# Patient Record
Sex: MALE | Race: WHITE | NOT HISPANIC OR LATINO | Employment: OTHER | URBAN - METROPOLITAN AREA
[De-identification: names, ages, dates, MRNs, and addresses within clinical notes are randomized per-mention and may not be internally consistent; named-entity substitution may affect disease eponyms.]

---

## 2021-07-29 ENCOUNTER — OFFICE VISIT (OUTPATIENT)
Dept: URGENT CARE | Facility: URGENT CARE | Age: 31
End: 2021-07-29

## 2021-07-29 VITALS
OXYGEN SATURATION: 98 % | HEART RATE: 80 BPM | TEMPERATURE: 98 F | DIASTOLIC BLOOD PRESSURE: 78 MMHG | SYSTOLIC BLOOD PRESSURE: 138 MMHG | RESPIRATION RATE: 20 BRPM

## 2021-07-29 DIAGNOSIS — R42 DIZZINESS: Primary | ICD-10-CM

## 2021-07-29 LAB
ALBUMIN SERPL-MCNC: 4.6 G/DL (ref 3.4–5)
ALP SERPL-CCNC: 65 U/L (ref 40–150)
ALT SERPL W P-5'-P-CCNC: 34 U/L
ANION GAP SERPL CALCULATED.3IONS-SCNC: 2 MMOL/L (ref 3–14)
AST SERPL W P-5'-P-CCNC: 27 U/L (ref 0–45)
BASOPHILS # BLD AUTO: 0 10E3/UL (ref 0–0.2)
BASOPHILS NFR BLD AUTO: 0 %
BILIRUB SERPL-MCNC: 1.1 MG/DL (ref 0.2–1.3)
BUN SERPL-MCNC: 10 MG/DL (ref 7–30)
CALCIUM SERPL-MCNC: 9.8 MG/DL (ref 8.5–10.1)
CHLORIDE BLD-SCNC: 105 MMOL/L (ref 94–109)
CO2 SERPL-SCNC: 30 MMOL/L (ref 20–32)
CREAT SERPL-MCNC: 1.2 MG/DL (ref 0.66–1.25)
EOSINOPHIL # BLD AUTO: 0.2 10E3/UL (ref 0–0.7)
EOSINOPHIL NFR BLD AUTO: 3 %
ERYTHROCYTE [DISTWIDTH] IN BLOOD BY AUTOMATED COUNT: 11.1 % (ref 10–15)
GFR SERPL CREATININE-BSD FRML MDRD: 80 ML/MIN/1.73M2
GLUCOSE BLD-MCNC: 91 MG/DL (ref 70–99)
HCT VFR BLD AUTO: 45.1 % (ref 40–53)
HGB BLD-MCNC: 15.2 G/DL (ref 13.3–17.7)
LYMPHOCYTES # BLD AUTO: 2.3 10E3/UL (ref 0.8–5.3)
LYMPHOCYTES NFR BLD AUTO: 32 %
MCH RBC QN AUTO: 32 PG (ref 26.5–33)
MCHC RBC AUTO-ENTMCNC: 33.7 G/DL (ref 31.5–36.5)
MCV RBC AUTO: 95 FL (ref 78–100)
MONOCYTES # BLD AUTO: 0.6 10E3/UL (ref 0–1.3)
MONOCYTES NFR BLD AUTO: 8 %
NEUTROPHILS # BLD AUTO: 4.1 10E3/UL (ref 1.6–8.3)
NEUTROPHILS NFR BLD AUTO: 57 %
PLATELET # BLD AUTO: 252 10E3/UL (ref 150–450)
POTASSIUM BLD-SCNC: 3.8 MMOL/L (ref 3.4–5.3)
PROT SERPL-MCNC: 7.8 G/DL (ref 6.8–8.8)
RBC # BLD AUTO: 4.75 10E6/UL (ref 4.4–5.9)
SODIUM SERPL-SCNC: 137 MMOL/L (ref 133–144)
WBC # BLD AUTO: 7.2 10E3/UL (ref 4–11)

## 2021-07-29 PROCEDURE — 36415 COLL VENOUS BLD VENIPUNCTURE: CPT | Performed by: FAMILY MEDICINE

## 2021-07-29 PROCEDURE — 99203 OFFICE O/P NEW LOW 30 MIN: CPT | Performed by: FAMILY MEDICINE

## 2021-07-29 PROCEDURE — 93000 ELECTROCARDIOGRAM COMPLETE: CPT | Performed by: FAMILY MEDICINE

## 2021-07-29 PROCEDURE — 80050 GENERAL HEALTH PANEL: CPT | Performed by: FAMILY MEDICINE

## 2021-07-29 NOTE — PATIENT INSTRUCTIONS
Patient Education     Possible Causes of Dizziness or Fainting    Dizziness and fainting can have many causes. Below are some examples of possible causes your healthcare provider will look to rule out.   Benign paroxysmal positional vertigo (BPPV)  BPPV results when calcium crystals inside the inner ear shift into the wrong position. BPPV causes episodes of vertigo, a spinning sensation. Episodes most often happen when the head is moved in a certain way. This is more common in people age 65 and older.    Infection or inflammation  The semicircular canals of the ear may become infected or inflamed. In this case, they can send the wrong balance signals. This can cause vertigo.   Meniere disease  Meniere disease happens when there is too much fluid in the semicircular canals. This can cause vertigo. It also can cause hearing problems and buzzing or ringing in the ears (called tinnitus). You may also have a feeling of pressure or fullness in the ear.   Syncope  Syncope is fainting that happens when the brain doesn t get enough oxygen-rich blood. It can be caused by low heart rate or low blood pressure. This is called vasovagal syncope. It can also be caused by sitting or standing up too quickly. This is called orthostatic hypotension. Syncope may also be due to a heart valve problem, an abnormal heart rhythm, or other heart problems. Dizziness can also happen from stroke, hemorrhage in the brain, or other problems in the brain. Your healthcare provider may do certain tests to rule out these conditions.   Other causes  Other causes include:    Medicines. Certain medicines can cause dizziness and even fainting. In some cases, stopping a medicine too quickly can lead to withdrawal symptoms, including dizziness and fainting.    Anxiety. Being anxious can lead to breathing changes, such as hyperventilation. These can lead to dizziness and fainting.  Additional causes for dizziness and fainting also exist. Talk with your  healthcare provider for more information.      Mario last reviewed this educational content on 5/1/2018 2000-2021 [employer]. All rights reserved. This information is not intended as a substitute for professional medical care. Always follow your healthcare professional's instructions.           Patient Education     Understanding Dizziness, Balance Problems, and Fainting     The eyes, inner ear, joints, and muscles send signals to the brain to achieve balance.     Balance is a group effort of the eyes, inner ear, joints, and muscles. They each send signals to the brain about body position and head movement. Then the brain uses this information to achieve balance. When the brain receives conflicting signals, or when there is a problem with blood flow, dizziness or fainting can happen.   Vertigo  Vertigo is the feeling of spinning. It may happen if the brain receives conflicting balance signals. Vertigo is often caused by a problem in the inner ear. Problems include changes in inner ear structures, infection, swelling, or excess fluid. Sometimes vertigo is due to a brain problem, such as migraine, stroke, or tumor.   Dysequilibrium  Dysequilibrium is the feeling of imbalance without a sense of spinning. It may happen if the signal path between the body and brain is disrupted. There are many causes of dysequilibrium, including diabetes, anemia, head injury, and aging.   Syncope  Syncope is losing consciousness or fainting. The brain needs oxygen-rich blood to function. The heart pumps that blood to the brain. If there is a problem with the heart, blood flow (such as low blood pressure), or blood vessels, you may faint.   Mario last reviewed this educational content on 5/1/2018 2000-2021 [employer]. All rights reserved. This information is not intended as a substitute for professional medical care. Always follow your healthcare professional's instructions.           Patient  "Education     Heart Palpitations    Palpitations are the feeling that your heart is beating hard, fast, or irregular. Some describe it as \"pounding,\" \"flip-flopping in the chest,\" or \"skipped beats.\" Palpitations may occur in someone with heart disease. But they can also occur in a healthy person.  Heart-related causes:    Heart rhythm problem (arrhythmia)    Heart valve disease    Disease of the heart muscle (cardiomyopathy)    Coronary artery disease    High blood pressure  Non-heart-related causes:    Certain medicines such as asthma inhalers and decongestants    Some herbal supplements, energy drinks and pills, and weight loss pills    Illegal stimulant drugs such as cocaine, crank, methamphetamine, PCP, bath salts, and ecstasy    Caffeine, alcohol, and tobacco    Health conditions such as thyroid disease, anemia, anxiety, and panic disorder  Sometimes the cause can't be found.  Home care  Follow these home care tips:    Don't use too much caffeine, alcohol, or tobacco, or any stimulant drugs.    Tell your doctor about any prescription or over-the-counter or herbal medicines you take.  Follow-up care    Follow up with your doctor, or as advised.    Call 911  This is the fastest and safest way to get to the emergency department. The paramedics can also start treatment on the way to the hospital, if needed.  Don't wait until your symptoms are severe to call 911. These are reasons to call 911:    Chest pain    Shortness of breath    Feeling lightheaded, faint, or dizzy, or losing consciousness    Very irregular heartbeat    Rapid heartbeat that makes you uncomfortable    Slower than usual heart rate along with symptoms    Chest pain with weakness, dizziness, heavy sweating, nausea, or vomiting    Extreme drowsiness, confusion, or weakness    Weakness of an arm or leg, or on one side of the face    Trouble with speech or vision  When to seek medical advice  Call your healthcare provider right away if you have " palpitations that last longer than normal, or are different from your past palpitations.  [employer] last reviewed this educational content on 11/1/2019 2000-2021 [employer]. All rights reserved. This information is not intended as a substitute for professional medical care. Always follow your healthcare professional's instructions.

## 2021-07-29 NOTE — PROGRESS NOTES
SUBJECTIVE:   Skylar Gilman is a 30 year old male presenting with a chief complaint of weak, dizziness, headaches.    4 weeks ago, started to feel weak and dizzy, will feel like he will pass out.  Brief episode, denies any LOC.  No known trigger.  Will occur couple of times a day but seems to occur by end of the day.  Denies vertigo/spinning.  Feels more fatigue, worried that may have low glucose.  Gets headaches when feels dizzy but again this subsides.  Denies any vision changes.  Denies any palpitations when symptoms starts.    Denies any recent URI, no fever.  Overall healthy, has been in [place] 5 years and has not seen a doctor.    Denies any ill contacts.  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      No past medical history on file.  No current outpatient medications on file.     Social History     Tobacco Use     Smoking status: Current Every Day Smoker     Types: Cigarettes     Smokeless tobacco: Never Used   Substance Use Topics     Alcohol use: Not Currently       ROS:  Review of systems negative except as stated above.    OBJECTIVE:  /78   Pulse 80   Temp 98  F (36.7  C) (Tympanic)   Resp 20   SpO2 98%   GENERAL APPEARANCE: healthy, alert and no distress  EYES: EOMI,  PERRL, conjunctiva clear  HENT: ear canals and TM's normal.  RESP: lungs clear to auscultation - no rales, rhonchi or wheezes  CV: regular rates and rhythm, normal S1 S2  Extremities: no peripheral edema or tenderness, peripheral pulses normal  PSYCH: mentation appears normal and affect normal/bright    Results for orders placed or performed in visit on 07/29/21   Comprehensive metabolic panel (BMP + Alb, Alk Phos, ALT, AST, Total. Bili, TP)     Status: Abnormal   Result Value Ref Range    Sodium 137 133 - 144 mmol/L    Potassium 3.8 3.4 - 5.3 mmol/L    Chloride 105 94 - 109 mmol/L    Carbon Dioxide (CO2) 30 20 - 32 mmol/L    Anion Gap 2 (L) 3 - 14 mmol/L    Urea Nitrogen 10 7 - 30 mg/dL     Creatinine 1.20 0.66 - 1.25 mg/dL    Calcium 9.8 8.5 - 10.1 mg/dL    Glucose 91 70 - 99 mg/dL    Alkaline Phosphatase 65 40 - 150 U/L    AST 27 0 - 45 U/L    ALT 34 U/L    Protein Total 7.8 6.8 - 8.8 g/dL    Albumin 4.6 3.4 - 5.0 g/dL    Bilirubin Total 1.1 0.2 - 1.3 mg/dL    GFR Estimate 80 >60 mL/min/1.73m2   CBC with platelets and differential     Status: None   Result Value Ref Range    WBC Count 7.2 4.0 - 11.0 10e3/uL    RBC Count 4.75 4.40 - 5.90 10e6/uL    Hemoglobin 15.2 13.3 - 17.7 g/dL    Hematocrit 45.1 40.0 - 53.0 %    MCV 95 78 - 100 fL    MCH 32.0 26.5 - 33.0 pg    MCHC 33.7 31.5 - 36.5 g/dL    RDW 11.1 10.0 - 15.0 %    Platelet Count 252 150 - 450 10e3/uL    % Neutrophils 57 %    % Lymphocytes 32 %    % Monocytes 8 %    % Eosinophils 3 %    % Basophils 0 %    Absolute Neutrophils 4.1 1.6 - 8.3 10e3/uL    Absolute Lymphocytes 2.3 0.8 - 5.3 10e3/uL    Absolute Monocytes 0.6 0.0 - 1.3 10e3/uL    Absolute Eosinophils 0.2 0.0 - 0.7 10e3/uL    Absolute Basophils 0.0 0.0 - 0.2 10e3/uL   CBC with platelets and differential     Status: None    Narrative    The following orders were created for panel order CBC with platelets and differential.  Procedure                               Abnormality         Status                     ---------                               -----------         ------                     CBC with platelets and d...[[id number]]                      Final result                 Please view results for these tests on the individual orders.       EKG - NSR, rate 73, no PVC, no ST c/w ischemia      ASSESSMENT/PLAN:  (R42) Dizziness  (primary encounter diagnosis)  Comment: unclear etiology  Plan: CBC with platelets and differential,         Comprehensive metabolic panel (BMP + Alb, Alk         Phos, ALT, AST, Total. Bili, TP), TSH with free        T4 reflex, EKG 12-lead complete w/read -         Clinics            Unclear etiology for brief intermittent dizziness and lightheadedness.   Patient appears well with normal vitals and no acute distress.  Reviewed importance of further evaluation if all pending labs are normal.  Encourage to drink plenty of fluids, especially due to recent hot weather and as he works outside.  Reviewed that cardiac etiology may require Holter or Zio patch to assess when he has episodes of dizziness but concerns regarding cost due to no medical health coverage.    Recommend to establish care with primary provider for further evaluation, should contact business office/ in regards to lack of medical coverage    Kael Truong MD,  July 29, 2021 8:46 PM

## 2021-07-30 LAB — TSH SERPL DL<=0.005 MIU/L-ACNC: 1.73 MU/L (ref 0.4–4)

## 2021-08-07 ENCOUNTER — HOSPITAL ENCOUNTER (EMERGENCY)
Facility: CLINIC | Age: 31
Discharge: HOME | End: 2021-08-08
Attending: EMERGENCY MEDICINE | Admitting: EMERGENCY MEDICINE

## 2021-08-07 ENCOUNTER — APPOINTMENT (OUTPATIENT)
Dept: GENERAL RADIOLOGY | Facility: CLINIC | Age: 31
End: 2021-08-07
Attending: EMERGENCY MEDICINE

## 2021-08-07 DIAGNOSIS — R55 SYNCOPE AND COLLAPSE: ICD-10-CM

## 2021-08-07 DIAGNOSIS — R07.89 ATYPICAL CHEST PAIN: ICD-10-CM

## 2021-08-07 LAB
ANION GAP SERPL CALCULATED.3IONS-SCNC: 7 MMOL/L (ref 3–14)
BASOPHILS # BLD AUTO: 0 10E3/UL (ref 0–0.2)
BASOPHILS NFR BLD AUTO: 1 %
BUN SERPL-MCNC: 15 MG/DL (ref 7–30)
CALCIUM SERPL-MCNC: 8.9 MG/DL (ref 8.5–10.1)
CHLORIDE BLD-SCNC: 108 MMOL/L (ref 94–109)
CO2 SERPL-SCNC: 26 MMOL/L (ref 20–32)
CREAT SERPL-MCNC: 1.21 MG/DL (ref 0.66–1.25)
EOSINOPHIL # BLD AUTO: 0.3 10E3/UL (ref 0–0.7)
EOSINOPHIL NFR BLD AUTO: 4 %
ERYTHROCYTE [DISTWIDTH] IN BLOOD BY AUTOMATED COUNT: 10.9 % (ref 10–15)
GFR SERPL CREATININE-BSD FRML MDRD: 79 ML/MIN/1.73M2
GLUCOSE BLD-MCNC: 143 MG/DL (ref 70–99)
HCT VFR BLD AUTO: 42.8 % (ref 40–53)
HGB BLD-MCNC: 14.5 G/DL (ref 13.3–17.7)
HOLD SPECIMEN: NORMAL
HOLD SPECIMEN: NORMAL
IMM GRANULOCYTES # BLD: 0 10E3/UL
IMM GRANULOCYTES NFR BLD: 0 %
LYMPHOCYTES # BLD AUTO: 2.6 10E3/UL (ref 0.8–5.3)
LYMPHOCYTES NFR BLD AUTO: 34 %
MCH RBC QN AUTO: 32.4 PG (ref 26.5–33)
MCHC RBC AUTO-ENTMCNC: 33.9 G/DL (ref 31.5–36.5)
MCV RBC AUTO: 96 FL (ref 78–100)
MONOCYTES # BLD AUTO: 0.6 10E3/UL (ref 0–1.3)
MONOCYTES NFR BLD AUTO: 8 %
NEUTROPHILS # BLD AUTO: 4.1 10E3/UL (ref 1.6–8.3)
NEUTROPHILS NFR BLD AUTO: 53 %
NRBC # BLD AUTO: 0 10E3/UL
NRBC BLD AUTO-RTO: 0 /100
PLATELET # BLD AUTO: 220 10E3/UL (ref 150–450)
POTASSIUM BLD-SCNC: 3.2 MMOL/L (ref 3.4–5.3)
RBC # BLD AUTO: 4.48 10E6/UL (ref 4.4–5.9)
SODIUM SERPL-SCNC: 141 MMOL/L (ref 133–144)
TROPONIN I SERPL-MCNC: <0.015 UG/L (ref 0–0.04)
WBC # BLD AUTO: 7.6 10E3/UL (ref 4–11)

## 2021-08-07 PROCEDURE — 71046 X-RAY EXAM CHEST 2 VIEWS: CPT

## 2021-08-07 PROCEDURE — 96361 HYDRATE IV INFUSION ADD-ON: CPT

## 2021-08-07 PROCEDURE — 93005 ELECTROCARDIOGRAM TRACING: CPT

## 2021-08-07 PROCEDURE — 258N000003 HC RX IP 258 OP 636: Performed by: EMERGENCY MEDICINE

## 2021-08-07 PROCEDURE — 250N000009 HC RX 250: Performed by: EMERGENCY MEDICINE

## 2021-08-07 PROCEDURE — 250N000013 HC RX MED GY IP 250 OP 250 PS 637: Performed by: EMERGENCY MEDICINE

## 2021-08-07 PROCEDURE — 85004 AUTOMATED DIFF WBC COUNT: CPT | Performed by: EMERGENCY MEDICINE

## 2021-08-07 PROCEDURE — 80048 BASIC METABOLIC PNL TOTAL CA: CPT | Performed by: EMERGENCY MEDICINE

## 2021-08-07 PROCEDURE — 99285 EMERGENCY DEPT VISIT HI MDM: CPT | Mod: 25

## 2021-08-07 PROCEDURE — 96374 THER/PROPH/DIAG INJ IV PUSH: CPT

## 2021-08-07 PROCEDURE — 36415 COLL VENOUS BLD VENIPUNCTURE: CPT | Performed by: EMERGENCY MEDICINE

## 2021-08-07 PROCEDURE — 84484 ASSAY OF TROPONIN QUANT: CPT | Performed by: EMERGENCY MEDICINE

## 2021-08-07 PROCEDURE — 93005 ELECTROCARDIOGRAM TRACING: CPT | Mod: 76

## 2021-08-07 RX ORDER — POTASSIUM CHLORIDE 1.5 G/1.58G
40 POWDER, FOR SOLUTION ORAL ONCE
Status: COMPLETED | OUTPATIENT
Start: 2021-08-08 | End: 2021-08-08

## 2021-08-07 RX ORDER — ASPIRIN 325 MG
325 TABLET ORAL ONCE
Status: COMPLETED | OUTPATIENT
Start: 2021-08-07 | End: 2021-08-07

## 2021-08-07 RX ADMIN — SODIUM CHLORIDE 1000 ML: 9 INJECTION, SOLUTION INTRAVENOUS at 23:37

## 2021-08-07 RX ADMIN — FAMOTIDINE 20 MG: 10 INJECTION INTRAVENOUS at 23:37

## 2021-08-07 RX ADMIN — ASPIRIN 325 MG ORAL TABLET 325 MG: 325 PILL ORAL at 23:37

## 2021-08-07 ASSESSMENT — ENCOUNTER SYMPTOMS
LIGHT-HEADEDNESS: 1
VOMITING: 0
SHORTNESS OF BREATH: 1
BLOOD IN STOOL: 0
CHEST TIGHTNESS: 1

## 2021-08-08 VITALS
OXYGEN SATURATION: 96 % | TEMPERATURE: 97.7 F | DIASTOLIC BLOOD PRESSURE: 69 MMHG | HEART RATE: 64 BPM | RESPIRATION RATE: 13 BRPM | SYSTOLIC BLOOD PRESSURE: 117 MMHG

## 2021-08-08 LAB
HOLD SPECIMEN: NORMAL
TROPONIN I SERPL-MCNC: <0.015 UG/L (ref 0–0.04)

## 2021-08-08 PROCEDURE — 84484 ASSAY OF TROPONIN QUANT: CPT | Performed by: EMERGENCY MEDICINE

## 2021-08-08 PROCEDURE — 36415 COLL VENOUS BLD VENIPUNCTURE: CPT | Performed by: EMERGENCY MEDICINE

## 2021-08-08 PROCEDURE — 250N000013 HC RX MED GY IP 250 OP 250 PS 637: Performed by: EMERGENCY MEDICINE

## 2021-08-08 RX ADMIN — POTASSIUM CHLORIDE 40 MEQ: 1.5 POWDER, FOR SOLUTION ORAL at 00:10

## 2021-08-08 NOTE — ED TRIAGE NOTES
Sudden onset Lt chest tightness when laying down to bed 30 min pta.  Pt diaphoretic and tachypneic in triage.

## 2021-08-08 NOTE — DISCHARGE INSTRUCTIONS
Discharge Instructions  Syncope    Syncope (fainting) is a sudden, short loss of consciousness (passing out spell). People will usually fall to the ground when they faint or slump over if seated.  People may also shake when this happens, and it can sometimes be difficult to tell the difference between syncope and a seizure. At this time, your provider does not find a reason to suspect that your fainting spell is a sign of anything dangerous or life-threatening.  However, sometimes the signs of serious illness do not show up right away.     Generally, every Emergency Department visit should have a follow-up clinic visit with either a primary or a specialty clinic/provider. Please follow-up as instructed by your emergency provider today.    Return to the Emergency Department if:  You faint again.   You have any significant bleeding.  You have chest pain or a fast or irregular heartbeat.  You feel short of breath.  You cough up any blood.  You have abdominal (belly) pain or unusual back pain.  You have ongoing vomiting (throwing up) or diarrhea (loose stools).  You have a black or tarry bowel movement, or blood in the stool or in your vomit.  You have a fever over 101 F.  You lose feeling or cannot move a part of your body or cannot talk normally.  You are confused, have a headache, cannot see well, or have a seizure.  DO NOT DRIVE. CALL 911 INSTEAD!    What can I do to help myself?  Follow any specific instructions that your provider discussed with you.  If you feel light-headed, make sure to sit down right away, even if you have to sit on the floor.  Follow up with your regular medical provider as discussed for further management. This may include lowering your blood pressure medications, insulin or other diabetic medications, checking your blood sugar more frequently, and drinking more fluids, taking medicines for vomiting or diarrhea or getting up slower.  If you were given a prescription for medicine here today,  be sure to read all of the information (including the package insert) that comes with your prescription.  This will include important information about the medicine, its side effects, and any warnings that you need to know about.  The pharmacist who fills the prescription can provide more information and answer questions you may have about the medicine.  If you have questions or concerns that the pharmacist cannot address, please call or return to the Emergency Department.   Remember that you can always come back to the Emergency Department if you are not able to see your regular provider in the amount of time listed above, if you get any new symptoms, or if there is anything that worries you.  Discharge Instructions  Chest Pain    You have been seen today for chest pain or discomfort.  At this time, your provider has found no signs that your chest pain is due to a serious or life-threatening condition, (or you have declined more testing and/or admission to the hospital). However, sometimes there is a serious problem that does not show up right away. Your evaluation today may not be complete and you may need further testing and evaluation.     Generally, every Emergency Department visit should have a follow-up clinic visit with either a primary or a specialty clinic/provider. Please follow-up as instructed by your emergency provider today.  Return to the Emergency Department if:  Your chest pain changes, gets worse, starts to happen more often, or comes with less activity.  You are newly short of breath.  You get very weak or tired.  You pass out or faint.  You have any new symptoms, like fever, cough, numb legs, or you cough up blood.  You have anything else that worries you.    Until you follow-up with your regular provider, please do the following:  Take one aspirin daily unless you have an allergy or are told not to by your provider.  If a stress test appointment has been made, go to the appointment.  If you  have questions, contact your regular provider.  Follow-up with your regular provider/clinic as directed; this is very important.    If you were given a prescription for medicine here today, be sure to read all of the information (including the package insert) that comes with your prescription.  This will include important information about the medicine, its side effects, and any warnings that you need to know about.  The pharmacist who fills the prescription can provide more information and answer questions you may have about the medicine.  If you have questions or concerns that the pharmacist cannot address, please call or return to the Emergency Department.       Remember that you can always come back to the Emergency Department if you are not able to see your regular provider in the amount of time listed above, if you get any new symptoms, or if there is anything that worries you.

## 2021-08-08 NOTE — ED PROVIDER NOTES
"  History   Chief Complaint:  Chest Pain    The history is provided by the patient and the spouse.      Skylar Gilman is a 30 year old male who presents with chest tightness. The patient reports three to four weeks of intermittent and random episodes of light-headedness. He also notes that a couple weeks ago, he woke up in the middle of the night from \"forgetting to breathe.\" He comes to the ED tonight because he experienced another episode of lightheadedness about an hour ago with associated shortness of breath (since resolved)  and chest tightness. Prior to the onset of his symptoms, Skylar recounts feeling gassy. The patient denies any black or bloody stool, or vomiting. Current smoker.     Review of Systems   Respiratory: Positive for chest tightness and shortness of breath.    Gastrointestinal: Negative for blood in stool and vomiting.   Neurological: Positive for light-headedness.   All other systems reviewed and are negative.        Allergies:  The patient has no known allergies.     Medications:  The patient is not currently taking any prescribed medications.    Past Medical History:    No past medical history on file.    Social History:  The patient presents to the ED with his significant other.     Physical Exam     Patient Vitals for the past 24 hrs:   BP Temp Temp src Pulse Resp SpO2   08/08/21 0100 117/69 -- -- 64 -- 96 %   08/08/21 0030 117/68 -- -- 66 -- 96 %   08/08/21 0000 119/64 -- -- 63 -- 96 %   08/07/21 2330 -- -- -- 73 -- 97 %   08/07/21 2300 120/71 -- -- 71 13 95 %   08/07/21 2213 (!) 157/87 97.7  F (36.5  C) Temporal 112 26 100 %       Physical Exam  Constitutional:  Oriented to person, place, and time.   HENT:   Head:    Normocephalic.   Mouth/Throat:   Oropharynx is clear and moist.   Eyes:    EOM are normal. Pupils are equal, round, and reactive to light.   Neck:    Neck supple.   Cardiovascular:  Normal rate, regular rhythm and normal heart sounds.      Exam reveals no gallop and " no friction rub.       No murmur heard.  Pulmonary/Chest:  Effort normal and breath sounds normal.      No respiratory distress. No wheezes. No rales.      No reproducible chest wall pain.  Abdominal:   Soft. No distension. No tenderness. No rebound and no guarding.   Musculoskeletal:  Normal range of motion.      2+ distal equal pulses.     No leg calf tenderness, swelling, or edema.  Neurological:   Alert and oriented to person, place, and time.           Moves all 4 extremities spontaneously    Skin:    No rash noted. No pallor.     Emergency Department Course   ECG  ECG taken at 2217, ECG read at 2217  Normal sinus rhythm. Normal ECG.   Rate 85 bpm. WY interval 148 ms. QRS duration 108 ms. QT/QTc 360/428 ms. P-R-T axes 77 75 60.     ECG #2  ECG taken at 0042, ECG read at 0043  Normal sinus rhythm. Normal ECG.    Rate 61 bpm. WY interval 146 ms. QRS duration 90 ms. QT/QTc 410/412 ms. P-R-T axes 20 75 63    Imaging:  XR chest PA & LAT  Negative chest.  As per radiology.    Laboratory:  CBC: WBC 7.6, HGB 14.5,      Troponin (Collected 2239): <0.015    BMP: potassium: 3.2(L), glucose: 143(H) o/w WNL (Creatinine 1.21)     Troponin (Collected 0038): <0.015    Emergency Department Course:    Reviewed:  I reviewed nursing notes, vitals, past medical history and care everywhere    Assessments:  2301 I obtained history and examined the patient as noted above.     Interventions:  2337 NS 1 L VI  2337 Aspirin 325 mg PO   2337 Pepcid 20 mg IV   0010 Potassium chloride 40 mEq PO     Disposition:  The patient was discharged to home.       Impression & Plan     Medical Decision Making:  This patient presents with a history and clinical exam consistent with syncope.  The differential for syncope is broad and includes etiologies such as cardiac arrythmia, ACS, aortic stenosis, HOCM, PE, orthostatic hypotension, drugs, situational, carotid hypersensitivity, seizure, TIA, stroke, vasovagal.   He has been having intermittent  mild chest pain at homes. EKG x2 as well as troponin are reassuring. He is low risk for heart score. I did set him up with an outpatient stress test. He is PERC negative and I would highly doubt pulmonary embolism. He is appropriate for outpatient workup.  There are no signs of a concerning etiology for syncope at this point.  In addition, there is no family history of sudden death, no chest pain, no seizure activity or post-ictal period, no murmur, and no signs of orthostasis in the ED, no focal neurologic symptoms, and no complaints of concerning headache.    The workup in the ED is negative and the physical exam is re-assurring.  Supportive outpatient management is therefore indicated.      Diagnosis:   1. Syncope    Plan:   1. Return to the ED with new or worse symptoms  2. Follow up with your PMD in 2 days for ongoing evaluation and management  3. Provided with standard [facility] Discharge instructions for Syncope      Covid-19  Skylar Gilman was evaluated during a global COVID-19 pandemic, which necessitated consideration that the patient might be at risk for infection with the SARS-CoV-2 virus that causes COVID-19.   Applicable protocols for evaluation were followed during the patient's care.     Diagnosis:    ICD-10-CM    1. Syncope and collapse  R55    2. Atypical chest pain  R07.89 Echo Stress Test with Definity       Discharge Medications:  None    Scribe Disclosure:  I, Favio Squires, am serving as a scribe at 11:01 PM on 8/7/2021 to document services personally performed by Danyel Wilson MD based on my observations and the provider's statements to me.            Danyel Wilson MD  08/08/21 [id number]

## 2021-08-09 LAB
ATRIAL RATE - MUSE: 61 BPM
ATRIAL RATE - MUSE: 85 BPM
DIASTOLIC BLOOD PRESSURE - MUSE: NORMAL MMHG
DIASTOLIC BLOOD PRESSURE - MUSE: NORMAL MMHG
INTERPRETATION ECG - MUSE: NORMAL
INTERPRETATION ECG - MUSE: NORMAL
P AXIS - MUSE: 20 DEGREES
P AXIS - MUSE: 77 DEGREES
PR INTERVAL - MUSE: 146 MS
PR INTERVAL - MUSE: 148 MS
QRS DURATION - MUSE: 108 MS
QRS DURATION - MUSE: 90 MS
QT - MUSE: 360 MS
QT - MUSE: 410 MS
QTC - MUSE: 412 MS
QTC - MUSE: 428 MS
R AXIS - MUSE: 75 DEGREES
R AXIS - MUSE: 75 DEGREES
SYSTOLIC BLOOD PRESSURE - MUSE: NORMAL MMHG
SYSTOLIC BLOOD PRESSURE - MUSE: NORMAL MMHG
T AXIS - MUSE: 60 DEGREES
T AXIS - MUSE: 63 DEGREES
VENTRICULAR RATE- MUSE: 61 BPM
VENTRICULAR RATE- MUSE: 85 BPM

## 2021-08-10 ENCOUNTER — HOSPITAL ENCOUNTER (OUTPATIENT)
Dept: CARDIOLOGY | Facility: CLINIC | Age: 31
Discharge: HOME | End: 2021-08-10
Attending: EMERGENCY MEDICINE | Admitting: EMERGENCY MEDICINE

## 2021-08-10 DIAGNOSIS — R07.89 ATYPICAL CHEST PAIN: ICD-10-CM

## 2021-08-10 PROCEDURE — 93350 STRESS TTE ONLY: CPT | Mod: 26 | Performed by: INTERNAL MEDICINE

## 2021-08-10 PROCEDURE — 93350 STRESS TTE ONLY: CPT | Mod: TC

## 2021-08-10 PROCEDURE — 93016 CV STRESS TEST SUPVJ ONLY: CPT | Performed by: INTERNAL MEDICINE

## 2021-08-10 PROCEDURE — 93325 DOPPLER ECHO COLOR FLOW MAPG: CPT | Mod: TC

## 2021-08-10 PROCEDURE — 93325 DOPPLER ECHO COLOR FLOW MAPG: CPT | Mod: 26 | Performed by: INTERNAL MEDICINE

## 2021-08-10 PROCEDURE — 93018 CV STRESS TEST I&R ONLY: CPT | Performed by: INTERNAL MEDICINE

## 2021-08-10 PROCEDURE — 93321 DOPPLER ECHO F-UP/LMTD STD: CPT | Mod: 26 | Performed by: INTERNAL MEDICINE

## 2021-08-22 ENCOUNTER — HEALTH MAINTENANCE LETTER (OUTPATIENT)
Age: 31
End: 2021-08-22

## 2021-10-17 ENCOUNTER — HEALTH MAINTENANCE LETTER (OUTPATIENT)
Age: 31
End: 2021-10-17

## 2022-02-04 ENCOUNTER — OFFICE VISIT (OUTPATIENT)
Dept: URGENT CARE | Facility: URGENT CARE | Age: 32
End: 2022-02-04

## 2022-02-04 ENCOUNTER — ANCILLARY PROCEDURE (OUTPATIENT)
Dept: GENERAL RADIOLOGY | Facility: CLINIC | Age: 32
End: 2022-02-04
Attending: PHYSICIAN ASSISTANT

## 2022-02-04 VITALS
TEMPERATURE: 98.4 F | HEART RATE: 72 BPM | OXYGEN SATURATION: 99 % | SYSTOLIC BLOOD PRESSURE: 124 MMHG | DIASTOLIC BLOOD PRESSURE: 70 MMHG

## 2022-02-04 DIAGNOSIS — R06.00 DYSPNEA, UNSPECIFIED TYPE: ICD-10-CM

## 2022-02-04 DIAGNOSIS — J38.3 VOCAL CORD DYSFUNCTION: ICD-10-CM

## 2022-02-04 DIAGNOSIS — R06.00 DYSPNEA, UNSPECIFIED TYPE: Primary | ICD-10-CM

## 2022-02-04 PROCEDURE — 99214 OFFICE O/P EST MOD 30 MIN: CPT | Performed by: PHYSICIAN ASSISTANT

## 2022-02-04 PROCEDURE — 71046 X-RAY EXAM CHEST 2 VIEWS: CPT | Performed by: RADIOLOGY

## 2022-02-05 NOTE — PATIENT INSTRUCTIONS
I suspect that you have vocal cord dysfunction.  I advise that you follow-up with ENT referral  If you have severe shortness of breath or difficulty breathing, please seek emergent help in ER.   Patient Education     Vocal Cord Dysfunction (VCD)  You have been told that you may have vocal cord dysfunction (VCD).   What is VCD?  The vocal cords are 2 bands of muscle and connective tissue inside the larynx (voice box). The larynx rests at the top of your trachea (windpipe). This is in your throat. Normally, when a person breathes in and out, air flows through the vocal cords and in and out of the lungs, allowing the person to breathe easily. But with VCD, the vocal cords close when they should open. When you breathe in, instead of opening, the vocal cords close and cut off the air supply.   Sometimes another part of your voice box above or around the vocal cords, is causing the blockage of your breathing. The problem is then called inspiratory laryngeal obstruction (ILO). VCD and ILO often have the same triggers and symptoms. They are also usually diagnosed and treated the same way.     What causes VCD?  The cause of VCD is often unclear. But these conditions or triggers can lead to it:     Post-nasal drip    Acid reflux, or gastroesophageal reflux disease (GERD)    Upper respiratory infections from a virus or bacteria    Exercise    Strong emotions, such as stress    Strong odors (fumes)    Cigarette smoke  Symptoms of VCD  Symptoms of VCD are like those for asthma. Sometimes people with VCD are thought to have asthma. But VCD symptoms are not eased by asthma medicines used to open the airway in your lungs, like bronchodilators. Also, symptoms of VCD often don't occur while you're sleeping.   Possible symptoms of VCD are:    Trouble breathing or shortness of breath. With VCD, you have more trouble breathing in than breathing out.    Noisy breathing (gasping, wheezing, or making raspy sounds)    Hoarseness    Voice  changes    Repeated coughing or throat clearing    Tightness in the chest or throat  Diagnosing VCD  You may be asked to do breathing tests to measure how well air flows into and out of your lungs. Laryngoscopy may also be done. This test allows the healthcare provider to view your vocal cords using a thin, often flexible scope called a laryngoscope. Because symptoms of VCD are like those of asthma, tests for asthma may be done. Some people may have both asthma and VCD.   Treating VCD  The main treatment for VCD is learning to manage and control symptoms when they occur. Your healthcare provider may advise:     Speech therapy. This teaches you how to control your vocal cords. A speech therapist or other specialist instructs you how to relax the muscles in your throat.    Relaxation techniques. Deep breathing, meditation, and activities like yoga can help reduce stress.    Biofeedback. This teaches you how to control certain physical functions and responses. You learn how to reduce muscle tension.    Psychotherapy. This treatment involves working with a specially trained mental health professional about your problems. He or she can help you better cope with stress.  Other methods of treatment may be available. Your healthcare provider can tell you more, if needed.   Preventing VCD  To help prevent episodes of VCD, make changes in your life to reduce and manage triggers. If the cause of your VCD is a health problem such as acid reflux, you may need to take medicine and change certain eating habits. If you smoke, quitting can help you control VCD. Stay away from secondhand smoke, too. Your healthcare provider can tell you more.   When to call 911  Get medical care right away if you have severe trouble breathing.  [employer] last reviewed this educational content on 2/1/2020 2000-2021 [employer]. All rights reserved. This information is not intended as a substitute for professional medical care. Always  follow your healthcare professional's instructions.           Patient Education     Vocal Cord Dysfunction (VCD)  You have been told that you may have vocal cord dysfunction (VCD).   What is VCD?  The vocal cords are 2 bands of muscle and connective tissue inside the larynx (voice box). The larynx rests at the top of your trachea (windpipe). This is in your throat. Normally, when a person breathes in and out, air flows through the vocal cords and in and out of the lungs, allowing the person to breathe easily. But with VCD, the vocal cords close when they should open. When you breathe in, instead of opening, the vocal cords close and cut off the air supply.   Sometimes another part of your voice box above or around the vocal cords, is causing the blockage of your breathing. The problem is then called inspiratory laryngeal obstruction (ILO). VCD and ILO often have the same triggers and symptoms. They are also usually diagnosed and treated the same way.     What causes VCD?  The cause of VCD is often unclear. But these conditions or triggers can lead to it:     Post-nasal drip    Acid reflux, or gastroesophageal reflux disease (GERD)    Upper respiratory infections from a virus or bacteria    Exercise    Strong emotions, such as stress    Strong odors (fumes)    Cigarette smoke  Symptoms of VCD  Symptoms of VCD are like those for asthma. Sometimes people with VCD are thought to have asthma. But VCD symptoms are not eased by asthma medicines used to open the airway in your lungs, like bronchodilators. Also, symptoms of VCD often don't occur while you're sleeping.   Possible symptoms of VCD are:    Trouble breathing or shortness of breath. With VCD, you have more trouble breathing in than breathing out.    Noisy breathing (gasping, wheezing, or making raspy sounds)    Hoarseness    Voice changes    Repeated coughing or throat clearing    Tightness in the chest or throat  Diagnosing VCD  You may be asked to do breathing  tests to measure how well air flows into and out of your lungs. Laryngoscopy may also be done. This test allows the healthcare provider to view your vocal cords using a thin, often flexible scope called a laryngoscope. Because symptoms of VCD are like those of asthma, tests for asthma may be done. Some people may have both asthma and VCD.   Treating VCD  The main treatment for VCD is learning to manage and control symptoms when they occur. Your healthcare provider may advise:     Speech therapy. This teaches you how to control your vocal cords. A speech therapist or other specialist instructs you how to relax the muscles in your throat.    Relaxation techniques. Deep breathing, meditation, and activities like yoga can help reduce stress.    Biofeedback. This teaches you how to control certain physical functions and responses. You learn how to reduce muscle tension.    Psychotherapy. This treatment involves working with a specially trained mental health professional about your problems. He or she can help you better cope with stress.  Other methods of treatment may be available. Your healthcare provider can tell you more, if needed.   Preventing VCD  To help prevent episodes of VCD, make changes in your life to reduce and manage triggers. If the cause of your VCD is a health problem such as acid reflux, you may need to take medicine and change certain eating habits. If you smoke, quitting can help you control VCD. Stay away from secondhand smoke, too. Your healthcare provider can tell you more.   When to call 911  Get medical care right away if you have severe trouble breathing.  [employer] last reviewed this educational content on 2/1/2020 2000-2021 [employer]. All rights reserved. This information is not intended as a substitute for professional medical care. Always follow your healthcare professional's instructions.

## 2022-02-05 NOTE — PROGRESS NOTES
"Assessment & Plan     1. Dyspnea, unspecified type  - XR Chest 2 Views; Future    2. Vocal cord dysfunction  - Otolaryngology Referral; Future      Patient comes in for evaluation of dyspnea with speaking ongoing for the past 2 weeks.  On exam, he looks well.  Lung and ENT exam is normal at this time.  Chest x-ray is normal per my read.  Some hoarseness is appreciated in his voice.  I suspect that his symptoms are related to vocal cord dysfunction.  I do have concerns as he has recently switched from smoking cigarettes to vaping, advised that he stop vaping now.  Referral to ENT was given for vocal cord dysfunction.  Discussed signs and symptoms for emergent follow-up.    Return in about 1 week (around 2/11/2022) for ENT.      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  Patient reports that for the past 2 weeks while talking he becomes easily winded.  He endorses having \"noisy \"breathing.  Had a terrible URI in February 2020, feels like it may have started at that time.  He denies having chest pain, pleurisy or hemoptysis.  He has not had any URI symptoms recently.  Approximately 1 month ago went from smoking cigarettes to vaping.  Had an echo last August which was normal.  No fever or chills.    No past medical history on file.  No past surgical history on file.  Social History     Tobacco Use     Smoking status: Current Every Day Smoker     Types: Cigarettes     Smokeless tobacco: Never Used   Substance Use Topics     Alcohol use: Not Currently     No current outpatient medications on file.     No current facility-administered medications for this visit.     No Known Allergies    10 " point ROS of systems were all negative except for pertinent positives noted in my HPI.      Exam:   /70   Pulse 72   Temp 98.4  F (36.9  C) (Tympanic)   SpO2 99%   Constitutional: healthy, alert and no distress  Head: Normocephalic, atraumatic.  Eyes: conjunctiva clear, no drainage  ENT: TMs clear and shiny fracisco, nasal mucosa pink and moist, throat without tonsillar hypertrophy or erythema  Neck: neck is supple, no cervical lymphadenopathy or nuchal rigidity  Cardiovascular: RRR  Respiratory: CTA bilaterally, no rhonchi or rales  Skin: no rashes  Neurologic: Speech clear, gait normal. Moves all extremities.    CXR -- No abnormality per my read

## 2022-10-03 ENCOUNTER — HEALTH MAINTENANCE LETTER (OUTPATIENT)
Age: 32
End: 2022-10-03

## 2022-10-06 ENCOUNTER — APPOINTMENT (OUTPATIENT)
Dept: ULTRASOUND IMAGING | Facility: CLINIC | Age: 32
End: 2022-10-06
Attending: EMERGENCY MEDICINE

## 2022-10-06 ENCOUNTER — HOSPITAL ENCOUNTER (EMERGENCY)
Facility: CLINIC | Age: 32
Discharge: HOME | End: 2022-10-06
Attending: EMERGENCY MEDICINE | Admitting: EMERGENCY MEDICINE

## 2022-10-06 VITALS
DIASTOLIC BLOOD PRESSURE: 82 MMHG | OXYGEN SATURATION: 97 % | HEART RATE: 77 BPM | SYSTOLIC BLOOD PRESSURE: 148 MMHG | TEMPERATURE: 98.1 F | RESPIRATION RATE: 19 BRPM

## 2022-10-06 DIAGNOSIS — R82.81 PYURIA: ICD-10-CM

## 2022-10-06 LAB
ALBUMIN UR-MCNC: NEGATIVE MG/DL
APPEARANCE UR: CLEAR
BILIRUB UR QL STRIP: NEGATIVE
COLOR UR AUTO: YELLOW
GLUCOSE UR STRIP-MCNC: NEGATIVE MG/DL
HGB UR QL STRIP: NEGATIVE
KETONES UR STRIP-MCNC: NEGATIVE MG/DL
LEUKOCYTE ESTERASE UR QL STRIP: NEGATIVE
MUCOUS THREADS #/AREA URNS LPF: PRESENT /LPF
NITRATE UR QL: NEGATIVE
PH UR STRIP: 5.5 [PH] (ref 5–7)
RBC URINE: 1 /HPF
SP GR UR STRIP: 1.03 (ref 1–1.03)
T VAGINALIS DNA SPEC QL NAA+PROBE: NOT DETECTED
UROBILINOGEN UR STRIP-MCNC: NORMAL MG/DL
WBC URINE: 1 /HPF

## 2022-10-06 PROCEDURE — 99284 EMERGENCY DEPT VISIT MOD MDM: CPT | Mod: 25

## 2022-10-06 PROCEDURE — 76870 US EXAM SCROTUM: CPT

## 2022-10-06 PROCEDURE — 87661 TRICHOMONAS VAGINALIS AMPLIF: CPT | Performed by: EMERGENCY MEDICINE

## 2022-10-06 PROCEDURE — 87491 CHLMYD TRACH DNA AMP PROBE: CPT | Performed by: EMERGENCY MEDICINE

## 2022-10-06 PROCEDURE — 81001 URINALYSIS AUTO W/SCOPE: CPT | Performed by: EMERGENCY MEDICINE

## 2022-10-06 PROCEDURE — 87591 N.GONORRHOEAE DNA AMP PROB: CPT | Performed by: EMERGENCY MEDICINE

## 2022-10-06 ASSESSMENT — ENCOUNTER SYMPTOMS
DYSURIA: 0
SHORTNESS OF BREATH: 0
VOMITING: 0
VOICE CHANGE: 0
FEVER: 0
NAUSEA: 0
NUMBNESS: 0

## 2022-10-06 NOTE — ED TRIAGE NOTES
Penis burning and right testicular aching x 4 days, denies any blood in urine or discharge, denies painful urination. VSS on RA.

## 2022-10-06 NOTE — ED NOTES
Rapid Assessment Note    History:   Skylar Gilman is a 31 year old male who presents with penile pain. The patient has had 4 days of constant pain in his penile region. After the 1st day, the pain became more of a burning sensation. He denies dysuria, urinary frequency, or penile discharge. He denies swelling or rash. He is sexually active.    Exam:   General:  Alert, interactive  Cardiovascular:  Well perfused  Lungs:  No respiratory distress, no accessory muscle use  Neuro:  Moving all 4 extremities  Skin:  Warm, dry  Psych:  Normal affect  :  Normal descended testicles   Cremasteric reflex intact bilaterally   No testicular or epididymal tenderness or mass appreciated   No inguinal hernia appreciated   No penile discharge      Plan of Care:   I evaluated the patient and developed an initial plan of care. I discussed this plan and explained that I, or one of my partners, would be returning to complete the evaluation.     I, Jameson Hired, am serving as a scribe to document services personally performed by Ermias Tompkins MD , based on my observations and the provider's statements to me.    10/6/2022  [employer] [employer]    Portions of this medical record were completed by a scribe. UPON MY REVIEW AND AUTHENTICATION BY ELECTRONIC SIGNATURE, this confirms (a) I performed the applicable clinical services, and (b) the record is accurate.        Ermias Tompkins MD  10/06/22 [id number]

## 2022-10-06 NOTE — ED PROVIDER NOTES
"  History     Chief Complaint:  Penis/Scrotum Problem     HPI:  The history is provided by the patient.      Skylar Gilman is an otherwise healthy 31 year old male who presents with 4 days of constant pain to his right testicle. On the second day of having this pain, he began having more of a \"burning\" sensation throughout his entire genitalia which has also remained constant. He denies dysuria and states that the pain/burning sensation is present during urination but does not worsen. He has had no associated symptoms such as penile discharge, fevers, nausea, vomiting, chest pain, shortness of breath, numbness, vision changes, hearing changes. He reports that he has had symptoms of this nature in the past; however, the pain has not persisted longer than 1-2 days. He has not sought medical evaluation for prior episodes. He suggests that these episodes could be related to the cold weather. He is  and sexually active only with his wife.  No anal intercourse or instrumentation.  No concern for STI. He states that he is otherwise healthy. He does not have a PCP.    Review of Systems   Constitutional: Negative for fever.   HENT: Negative for hearing loss and voice change.    Eyes: Negative for visual disturbance.   Respiratory: Negative for shortness of breath.    Cardiovascular: Negative for chest pain.   Gastrointestinal: Negative for nausea and vomiting.   Genitourinary: Positive for testicular pain. Negative for dysuria and penile discharge.   Neurological: Negative for numbness.   All other systems reviewed and are negative.    Allergies:  The patient has no known allergies.     Medications:  The patient is currently on no regular medications.     Past Medical History:     The patient denies past medical history.    Social History:  The patient presents to the ED alone.  The patient presents to the ED via car.   The patient is  and sexually active with his wife.    Physical Exam     Patient " Vitals for the past 24 hrs:   BP Temp Temp src Pulse Resp SpO2   10/06/22 1034 (!) 148/82 98.1  F (36.7  C) Temporal 77 19 97 %     Physical Exam  General: Sitting on the ED bed, no distress  HEENT: Normocephalic, atraumatic  Cardiac: Warm and well perfused  Pulm: Breathing comfortably, no accessory muscle usage, no conversational dyspnea  GI: Abdomen soft, nontender, no rigidity or guarding  : No penile discharge, no penile or scrotal redness or swelling, no testicular or epididymal tenderness to palpation on the right.  No palpable indirect inguinal hernia with cough.  MSK: No deformities  Skin: Warm and dry  Neuro: Moves all extremities x4, no focal deficits  Psych: Normal mood and affect     Emergency Department Course     Imaging:    US Testicular & Scrotum w Doppler Ltd  Negative for torsion or other acute process.  Report per radiology    Laboratory:  Labs Ordered and Resulted from Time of ED Arrival to Time of ED Departure   ROUTINE UA WITH MICROSCOPIC REFLEX TO CULTURE - Abnormal       Result Value    Color Urine Yellow      Appearance Urine Clear      Glucose Urine Negative      Bilirubin Urine Negative      Ketones Urine Negative      Specific Gravity Urine 1.026      Blood Urine Negative      pH Urine 5.5      Protein Albumin Urine Negative      Urobilinogen Urine Normal      Nitrite Urine Negative      Leukocyte Esterase Urine Negative      Mucus Urine Present (*)     RBC Urine 1      WBC Urine 1     TRICHOMONAS VAGINALIS BY PCR   CHLAMYDIA TRACHOMATIS PCR   NEISSERIA GONORRHOEAE PCR     Emergency Department Course:       Reviewed:  I reviewed nursing notes, vitals, past medical history and Care Everywhere    Assessments:  1232 I obtained history and examined the patient as noted above.     Disposition:  The patient was discharged to home.     Impression & Plan     Medical Decision Making:  Skylar Gilman is a 31 year old male who presents to the emergency department for evaluation of burning  genitalia.  He is otherwise well-appearing and does not appear to be systemically ill.  His ultrasound is negative for acute findings and his UA is without evidence of UTI.  His exam as above is largely nonfocal.  He is in a monogamous relationship with his spouse, however STI still remains a possibility.  Urine studies for gonorrhea, chlamydia, trichomonas were ordered accordingly.  Plan is for discharge home with follow-up as an outpatient as indicated.    Diagnosis:    ICD-10-CM    1. Pyuria  R82.81      Scribe Disclosure:  I, Suzanna Lin, am serving as a scribe at 12:22 PM on 10/6/2022 to document services personally performed by Dank Valle MD based on my observations and the provider's statements to me.     MD Leonard LYON Colin, MD  10/06/22 [id number]

## 2022-10-07 LAB
C TRACH DNA SPEC QL NAA+PROBE: NEGATIVE
N GONORRHOEA DNA SPEC QL NAA+PROBE: NEGATIVE

## 2022-10-07 NOTE — RESULT ENCOUNTER NOTE
Final result for both N. Gonorrhoeae PCR and Chlamydia Trachomatis PCR are NEGATIVE.  No treatment or change in treatment per [facility] ED Lab Result N. Gonorrhea AND/OR Chlamydia T. protocol.

## 2023-03-23 ENCOUNTER — ANCILLARY PROCEDURE (OUTPATIENT)
Dept: GENERAL RADIOLOGY | Facility: CLINIC | Age: 33
End: 2023-03-23
Attending: FAMILY MEDICINE
Payer: COMMERCIAL

## 2023-03-23 ENCOUNTER — OFFICE VISIT (OUTPATIENT)
Dept: URGENT CARE | Facility: URGENT CARE | Age: 33
End: 2023-03-23
Payer: COMMERCIAL

## 2023-03-23 VITALS
RESPIRATION RATE: 16 BRPM | SYSTOLIC BLOOD PRESSURE: 138 MMHG | WEIGHT: 213.25 LBS | HEART RATE: 71 BPM | DIASTOLIC BLOOD PRESSURE: 72 MMHG | OXYGEN SATURATION: 97 % | TEMPERATURE: 98.2 F

## 2023-03-23 DIAGNOSIS — R42 DIZZINESS: Primary | ICD-10-CM

## 2023-03-23 DIAGNOSIS — M54.2 NECK PAIN: ICD-10-CM

## 2023-03-23 PROCEDURE — 72040 X-RAY EXAM NECK SPINE 2-3 VW: CPT | Mod: TC | Performed by: RADIOLOGY

## 2023-03-23 PROCEDURE — 99214 OFFICE O/P EST MOD 30 MIN: CPT | Performed by: FAMILY MEDICINE

## 2023-03-23 RX ORDER — METHOCARBAMOL 500 MG/1
500 TABLET, FILM COATED ORAL 4 TIMES DAILY PRN
Qty: 30 TABLET | Refills: 0 | Status: SHIPPED | OUTPATIENT
Start: 2023-03-23 | End: 2023-03-28

## 2023-03-23 NOTE — PATIENT INSTRUCTIONS
Okay to take ibuprofen 200 mg - 4 tablets (800 mg) every 8 hours as needed.  Okay to take tylenol 500 mg - 2 tablets (1000 mg) every 6-8 hours as needed, do not exceed 3000 mg in 24 hours.  Okay to take muscle relaxant to help with neck muscle tightness/spasm    Follow up with spine clinic    Follow up with primary provider for further work up for dizziness

## 2023-03-23 NOTE — PROGRESS NOTES
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    Will get dizziness episode once a month, will last for weeks at time.  Did have cardiac work up and blood test and reassuring.    Feels lightheaded will last for weeks and then will resolve on its own.    Vertigo for couple of minutes up to 15 minutes, worse with certain movement.  This will resolve on its own.  No ringing or hear loss associated.    Denies any specific trauma.  Does work in construction work for 6 years, developed more discomfort over the past 3 years.  Feels like hands are weaker.  Denies any numbness or tingling, denies any headache.    No past medical history on file.  No current outpatient medications on file.     Social History     Tobacco Use     Smoking status: Every Day     Types: Cigarettes     Smokeless tobacco: Never   Substance Use Topics     Alcohol use: Not Currently       ROS:  Review of systems negative except as stated above.    EXAM:   /72   Pulse 71   Temp 98.2  F (36.8  C)   Resp 16   Wt 96.7 kg (213 lb 4 oz)   SpO2 97%   GENERAL APPEARANCE: healthy, alert and no distress  NECK: supple, mild tender to palpation on trapezius muscles, FROM   EXTREMITIES: peripheral pulses normal  PSYCH:alert, affect bright    X-RAY was done - cervical - loss of normal cervical curvature, no acute fracture personally viewed by me    ASSESSMENT/PLAN:  (R42) Dizziness  (primary encounter diagnosis)  Comment: chronic  Plan: follow up with primary provider      (M54.2) Neck pain  Plan: XR Cervical Spine 2/3 Views, methocarbamol         (ROBAXIN) 500 MG tablet, Spine          Referral            Discussed patient's concern for  dizziness/lightheadedness which has been ongoing concern for the past 2 years, prior evaluation for cardiac etiology has been negative and discussed that further work up is thru primary provider.    Reviewed neck pain and probable muscle spasm, patient complains of bilateral arm weakness so will refer to Spine Clinic for further evaluation.  RX robaxin given for neck muscle spasm, okay for tylenol and ibuprofen.  Will follow up on formal Xray report and notify if any abnormalities.    Follow up with primary provider in 1-2 weeks  Follow up with spine clinic in 1-2 weeks    Kael Truong MD  March 23, 2023 12:30 PM

## 2023-03-28 ENCOUNTER — OFFICE VISIT (OUTPATIENT)
Dept: FAMILY MEDICINE | Facility: CLINIC | Age: 33
End: 2023-03-28
Payer: COMMERCIAL

## 2023-03-28 VITALS
RESPIRATION RATE: 16 BRPM | HEIGHT: 70 IN | WEIGHT: 215 LBS | HEART RATE: 92 BPM | SYSTOLIC BLOOD PRESSURE: 120 MMHG | DIASTOLIC BLOOD PRESSURE: 82 MMHG | BODY MASS INDEX: 30.78 KG/M2 | OXYGEN SATURATION: 97 % | TEMPERATURE: 97.7 F

## 2023-03-28 DIAGNOSIS — M54.2 NECK PAIN: ICD-10-CM

## 2023-03-28 DIAGNOSIS — R73.9 HYPERGLYCEMIA: ICD-10-CM

## 2023-03-28 DIAGNOSIS — R42 VERTIGO: Primary | ICD-10-CM

## 2023-03-28 DIAGNOSIS — Z11.4 SCREENING FOR HIV (HUMAN IMMUNODEFICIENCY VIRUS): ICD-10-CM

## 2023-03-28 DIAGNOSIS — Z11.59 NEED FOR HEPATITIS C SCREENING TEST: ICD-10-CM

## 2023-03-28 LAB
ANION GAP SERPL CALCULATED.3IONS-SCNC: 13 MMOL/L (ref 7–15)
BUN SERPL-MCNC: 15.9 MG/DL (ref 6–20)
CALCIUM SERPL-MCNC: 9.8 MG/DL (ref 8.6–10)
CHLORIDE SERPL-SCNC: 102 MMOL/L (ref 98–107)
CREAT SERPL-MCNC: 1.11 MG/DL (ref 0.67–1.17)
DEPRECATED HCO3 PLAS-SCNC: 26 MMOL/L (ref 22–29)
ERYTHROCYTE [DISTWIDTH] IN BLOOD BY AUTOMATED COUNT: 11.6 % (ref 10–15)
GFR SERPL CREATININE-BSD FRML MDRD: 89 ML/MIN/1.73M2
GLUCOSE SERPL-MCNC: 93 MG/DL (ref 70–99)
HBA1C MFR BLD: 4.7 % (ref 0–5.6)
HCT VFR BLD AUTO: 45.1 % (ref 40–53)
HGB BLD-MCNC: 15.8 G/DL (ref 13.3–17.7)
MCH RBC QN AUTO: 32.4 PG (ref 26.5–33)
MCHC RBC AUTO-ENTMCNC: 35 G/DL (ref 31.5–36.5)
MCV RBC AUTO: 92 FL (ref 78–100)
PLATELET # BLD AUTO: 247 10E3/UL (ref 150–450)
POTASSIUM SERPL-SCNC: 4.2 MMOL/L (ref 3.4–5.3)
RBC # BLD AUTO: 4.88 10E6/UL (ref 4.4–5.9)
SODIUM SERPL-SCNC: 141 MMOL/L (ref 136–145)
WBC # BLD AUTO: 7.4 10E3/UL (ref 4–11)

## 2023-03-28 PROCEDURE — 87389 HIV-1 AG W/HIV-1&-2 AB AG IA: CPT | Performed by: FAMILY MEDICINE

## 2023-03-28 PROCEDURE — 83036 HEMOGLOBIN GLYCOSYLATED A1C: CPT | Performed by: FAMILY MEDICINE

## 2023-03-28 PROCEDURE — 85027 COMPLETE CBC AUTOMATED: CPT | Performed by: FAMILY MEDICINE

## 2023-03-28 PROCEDURE — 99215 OFFICE O/P EST HI 40 MIN: CPT | Performed by: FAMILY MEDICINE

## 2023-03-28 PROCEDURE — 86803 HEPATITIS C AB TEST: CPT | Performed by: FAMILY MEDICINE

## 2023-03-28 PROCEDURE — 80048 BASIC METABOLIC PNL TOTAL CA: CPT | Performed by: FAMILY MEDICINE

## 2023-03-28 PROCEDURE — 36415 COLL VENOUS BLD VENIPUNCTURE: CPT | Performed by: FAMILY MEDICINE

## 2023-03-28 RX ORDER — MECLIZINE HYDROCHLORIDE 25 MG/1
25 TABLET ORAL 3 TIMES DAILY PRN
Qty: 30 TABLET | Refills: 1 | Status: SHIPPED | OUTPATIENT
Start: 2023-03-28 | End: 2023-10-01

## 2023-03-28 ASSESSMENT — PAIN SCALES - GENERAL: PAINLEVEL: NO PAIN (0)

## 2023-03-28 NOTE — PROGRESS NOTES
"  Assessment & Plan     Vertigo  Symptoms seem consistent with benign paroxysmal positional vertigo.  I recommended checking labs as listed below and he was given information on vestibular exercises he can do at home.  He was also given a referral for vestibular rehab through physical therapy.  I sent in a prescription for meclizine that he may use as needed.  If symptoms do not improve he will schedule follow-up appointment.  - meclizine (ANTIVERT) 25 MG tablet; Take 1 tablet (25 mg) by mouth 3 times daily as needed for dizziness  - Physical Therapy Referral; Future  - CBC with platelets; Future  - Basic metabolic panel  (Ca, Cl, CO2, Creat, Gluc, K, Na, BUN); Future    Hyperglycemia  He has a history of hyperglycemia and a strong family history of diabetes.  I recommended including a hemoglobin A1c as part of his lab work-up today.  - Hemoglobin A1c; Future    Neck pain  He has some neck pain symptoms that may be contributing to the dizziness/vertigo symptoms.  It is reassuring that the x-ray of the cervical spine from 3/23/2023 was normal.  He is not having any radicular symptoms on exam today.  If symptoms persist we will consider further imaging studies.    Screening for HIV (human immunodeficiency virus)  - HIV Antigen Antibody Combo; Future    Need for hepatitis C screening test  - Hepatitis C Screen Reflex to HCV RNA Quant and Genotype; Future      40 minutes spent by me on the date of the encounter doing chart review        BMI:   Estimated body mass index is 31.29 kg/m  as calculated from the following:    Height as of this encounter: 1.765 m (5' 9.5\").    Weight as of this encounter: 97.5 kg (215 lb).   Weight management plan: Discussed healthy diet and exercise guidelines        Kristofer Canales, DO  [facility]    Shantel Cheng is a 32 year old, presenting for the following health issues:  Dizziness  No flowsheet data found.  History of Present Illness       Reason for " "visit:  Feel lightheaded or feels like loosing balance while doing certain movements, feels off balance and unsteady   : for 2 years intermittent dizziness.  Symptom intensity:  Severe  Symptom progression:  Worsening  Had these symptoms before:  Yes  Has tried/received treatment for these symptoms:  No  What makes it worse:  When moving head or bending down or reaching top shelf    He eats 0-1 servings of fruits and vegetables daily.He consumes 1 sweetened beverage(s) daily.He exercises with enough effort to increase his heart rate 9 or less minutes per day.  He exercises with enough effort to increase his heart rate 3 or less days per week.   He is taking medications regularly.             He has had episodic dizziness symptoms worse with certain movements over the past couple of years.  He was recently seen in the urgent care for this and had an x-ray of the cervical spine on 3/23/2023 which was normal.  On 8/7/2021 he had a normal CBC and a BMP which showed a glucose of 143 and a potassium of 3.2.  He reports that the symptoms are worse when bending over or turning his head while driving.  The symptoms typically last 5-15 minutes before they resolve.  He often feels slightly open \"offkilter\" for several days after an episode.  He has not tried any medication for this.  He reports that there is a strong family history of diabetes.  He has been checking his blood sugars at home and they are typically ranging in the 90s-160s.  He reports that home hemoglobin A1c test results have been around 4.4%.  Also describes having neck pain symptoms.  He denies having pain radiating down the arms.  He denies any specific injury that may have caused this.  On 8/10/2021 he had a rest echocardiogram which was essentially normal with the exception of borderline aortic root dilatation.      CERVICAL SPINE TWO TO THREE VIEWS  3/23/2023 11:58 AM   COMPARISON: None.  HISTORY: Neck pain, dizziness.                                      " "                             IMPRESSION: There is normal alignment of the cervical vertebrae.  Vertebral body heights of the cervical spine are normal.  Craniocervical alignment is normal. There are no fractures of the  cervical spine.        Stress Echo 8/10/2021 Interpretation Summary  Borderline aortic root dilatation.  There was no chest pain or significant ST changes with exercise.  This was a normal stress echocardiogram with no evidence of stress-induced  ischemia.  Normal resting wall motion and no stress-induced wall motion abnormality.         Review of Systems   Constitutional, HEENT, cardiovascular, pulmonary, GI, , musculoskeletal, neuro, skin, endocrine and psych systems are negative, except as otherwise noted.      Objective    /82 (BP Location: Left arm, Patient Position: Sitting, Cuff Size: Adult Regular)   Pulse 92   Temp 97.7  F (36.5  C) (Temporal)   Resp 16   Ht 1.765 m (5' 9.5\")   Wt 97.5 kg (215 lb)   SpO2 97%   BMI 31.29 kg/m    Body mass index is 31.29 kg/m .  Physical Exam   GENERAL: healthy, alert and no distress  EYES: Eyes grossly normal to inspection, PERRL and conjunctivae and sclerae normal  HENT: ear canals and TM's normal, nose and mouth without ulcers or lesions  NECK: no adenopathy, no asymmetry, masses, or scars and thyroid normal to palpation  RESP: lungs clear to auscultation - no rales, rhonchi or wheezes  CV: regular rate and rhythm, normal S1 S2, no S3 or S4, no murmur, click or rub, no peripheral edema and peripheral pulses strong  MS: no gross musculoskeletal defects noted, no edema  SKIN: no suspicious lesions or rashes  NEURO: Normal strength and tone, mentation intact and speech normal.  S[place]uma-Hallpike maneuver reveals some lateral nystagmus when head turned to the right, but he denied any vertigo symptoms with that maneuver.  PSYCH: mentation appears normal, affect normal/bright                    "

## 2023-03-29 LAB
HCV AB SERPL QL IA: NONREACTIVE
HIV 1+2 AB+HIV1 P24 AG SERPL QL IA: NONREACTIVE

## 2023-04-03 ENCOUNTER — HOSPITAL ENCOUNTER (OUTPATIENT)
Dept: PHYSICAL THERAPY | Facility: CLINIC | Age: 33
Discharge: HOME | End: 2023-04-03
Attending: FAMILY MEDICINE
Payer: COMMERCIAL

## 2023-04-03 DIAGNOSIS — R68.89 DECREASED ACTIVITY TOLERANCE: Primary | ICD-10-CM

## 2023-04-03 DIAGNOSIS — R42 VERTIGO: ICD-10-CM

## 2023-04-03 PROCEDURE — 97161 PT EVAL LOW COMPLEX 20 MIN: CPT | Mod: GP | Performed by: PHYSICAL THERAPIST

## 2023-04-03 PROCEDURE — 97112 NEUROMUSCULAR REEDUCATION: CPT | Mod: GP | Performed by: PHYSICAL THERAPIST

## 2023-04-03 NOTE — PROGRESS NOTES
04/03/23 0915   Quick Adds   Quick Adds Vestibular Eval   Type of Visit Initial OP PT Evaluation   General Information   Start of Care Date 04/03/23   Referring Physician Kristofer Leonard, DO   Orders Evaluate and Treat as Indicated   Order Date 03/28/23   Medical Diagnosis Vertigo   Onset of illness/injury or Date of Surgery 08/01/21   Surgical/Medical history reviewed Yes   Pertinent history of current problem (include personal factors and/or comorbidities that impact the POC) Patient is a 32 y.o. male referred to physical therapy to eval and treat vertigo.  Per chart review, 2 year intermittent dizziness with patient reporting feeling lightheaded or loosing balance while doing certain movements (moving head, bending down, or reaching top shelf).  X-ray of cervical spine on 3/23/2023 was normal.  Significant PMH includes hyperglycemia and neck pain.  Patient states that he has been trying to figure out what is contributing to his symptoms - caffeine? Blood sugar? Other?  Patient reports that first episode was in August 2021 when finishing a construction project - it was late at night, painting lower trim in a closet and he almost blacked out x 2.  Denies room spinning sensation.  Reports that he feels  tightness  in back of head/neck when bending and that turning head when driving  something is wrong.   Patient reports symptoms of light-headed and off balance comes in attacks and it is present with movement.  Patient reporting symptoms daily and that the attacks last 3-5 minutes.  Bending over and moving head back makes the symptoms worse.  Patient reports that the most popular place he experiences the symptoms is in the kitchen - bending low to grab something or reaching up high.  Reports three weeks ago significant attack when in garage and reaching up high to get something.  Denies recent illness, hearing or changes in vision.   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 No   Have you fallen and had an injury in the past year? No   Is patient a fall risk? No   Abuse Screen (yes response referral indicated)   Feels Unsafe at Home or Work/School no   Feels Threatened by Someone no   Does Anyone Try to Keep You From Having Contact with Others or Doing Things Outside Your Home? no   Physical Signs of Abuse Present no   Pain   Patient currently in pain No   Posture   Posture Forward head position;Protracted shoulders   Transfer Skills   Transfer Comments Independent   Gait   Gait Comments Independent   Balance   Balance Comments No instability observed with functional mobility.   Cervicogenic Screen   Neck ROM WFL for vestibular testing   Vertebral Artery Test Normal   Oculomotor Exam   Smooth Pursuit Normal   Saccades Other   Saccades Comments intermittent undershooting towards left, not consistent   VOR Normal   VOR Cancellation Normal   Rapid Head Thrust Normal   Convergence Testing Normal   Infrared Goggle Exam or Frenzel Lense Exam   Vestibular Suppressant in Last 24 Hours? No   Exam completed with Infrared Goggles   Spontaneous Nystagmus Negative   Gaze Evoked Nystagmus Negative   [place]ki-Hallpike (right) Negative   [place]-Hallpike (Left) Negative   HSCC Supine Roll Test (Right) Negative   HSCC Supine Roll Test (Left) Negative   Dynamic Visual Acuity (DVA)   Static Acuity (LogMar) 11   Horizontal Head Movement at 1 Hz (LogMar) 3   DVA Comments reports of symptoms   Planned Therapy Interventions   Planned Therapy Interventions neuromuscular  re-education;ROM;strengthening;stretching;manual therapy;joint mobilization;other (see comments)  (vestibular rehab)   Clinical Impression   Criteria for Skilled Therapeutic Interventions Met yes, treatment indicated   PT Diagnosis Decreased activity tolerance   Influenced by the following impairments Limitations with gaze stabilization, intermittent undershooting towards left with horizontal saccades, increased muscle tone/tightness cervical muscles   Functional limitations due to impairments Decreased tolerance for IADLs, job duties, functional mobility, driving   Clinical Presentation Stable/Uncomplicated   Clinical Presentation Rationale Based on current presentation, PMH, and social support.   Clinical Decision Making (Complexity) Low complexity   Therapy Frequency 1 time/week   Predicted Duration of Therapy Intervention (days/wks) 6 weeks   Risk & Benefits of therapy have been explained Yes   Patient, Family & other staff in agreement with plan of care Yes   Clinical Impression Comments Patient arriving today with 2 year history of light-headed and/or off balance sensation when performing certain movements.  Negative finding for BPPV or vestibular hypofunction today.  Patient demonstrating limitations with gaze stabilization per DVA and intermittent under shooting towards left with horizontal saccades (no other central signs observed).  Patient reporting symptoms reproduced with VOR x 1 and that he felt that his neck movement wasn t smooth while performing; discussed possibility of cervicogenic dizziness.  Patient will benefit from skilled physical therapy to address above impairments in order to decrease symptoms and maximize tolerance for daily activities and mobility.   Education Assessment   Preferred Learning Style Demonstration;Listening;Pictures/video   Barriers to Learning No barriers   GOALS   PT Eval Goals 1;2   Goal 1   Goal Identifier DHI   Goal Description The patient will demonstrate an 18 point  improvement in DHI scoring (18 or less) to demonstrate a statistically significant improvement in dizziness symptom severity in order to increase tolerance for functional mobility tasks and daily activities.   Goal Progress   (Eval: 36/100)   Target Date 05/15/23   Goal 2   Goal Identifier DVA   Goal Description The patient will score within 2 lines of SVA with performance of DVA to demonstrate that his vestibular system is functioning optimally and is able to support gaze stability within a functional range.   Goal Progress   (Eval: 8 line difference)   Target Date 05/15/23   Total Evaluation Time   PT Eval, Low Complexity Minutes (28581) 32

## 2023-04-14 ENCOUNTER — HOSPITAL ENCOUNTER (OUTPATIENT)
Dept: PHYSICAL THERAPY | Facility: CLINIC | Age: 33
Discharge: HOME | End: 2023-04-14
Payer: COMMERCIAL

## 2023-04-14 DIAGNOSIS — M54.2 NECK PAIN: Primary | ICD-10-CM

## 2023-04-14 DIAGNOSIS — R42 VERTIGO: ICD-10-CM

## 2023-04-14 DIAGNOSIS — R68.89 DECREASED ACTIVITY TOLERANCE: ICD-10-CM

## 2023-04-14 PROCEDURE — 97140 MANUAL THERAPY 1/> REGIONS: CPT | Mod: GP | Performed by: PHYSICAL THERAPIST

## 2023-04-14 PROCEDURE — 97110 THERAPEUTIC EXERCISES: CPT | Mod: GP | Performed by: PHYSICAL THERAPIST

## 2023-05-03 NOTE — PROGRESS NOTES
[facility]    Outpatient Physical Therapy Discharge Note  Patient: Skylar Gilman  : 1990    Beginning/End Dates of Reporting Period:  4/3/2023 to 2023; total of 2 visits    Referring Provider: Kristofer Leonard, DO    Therapy Diagnosis: Decreased activity tolerance     Client Self Report: Patient arriving today and states that he is feeling better.  Patient reports hasn't been consistent with HEP, but has performed it a few times - patient reporting that he had neck pain and headache (occipital) with the exercise.    Goals:  Goal Identifier DHI   Goal Description The patient will demonstrate an 18 point improvement in DHI scoring (18 or less) to demonstrate a statistically significant improvement in dizziness symptom severity in order to increase tolerance for functional mobility tasks and daily activities.   Target Date 05/15/23   Date Met      Progress (detail required for progress note):  (Eval: 36/100)     Goal Identifier DVA   Goal Description The patient will score within 2 lines of SVA with performance of DVA to demonstrate that his vestibular system is functioning optimally and is able to support gaze stability within a functional range.   Target Date 05/15/23   Date Met      Progress (detail required for progress note):  (Eval: 8 line difference)     Unable to assess progress towards goals as patient cancelled remaining appointments.  Per last attended session, patient was feeling better and if he continued to feel better was going to cancel future visits.  Discussed referral to ortho PT if wanting to address neck pain.    Plan:  Discharge from therapy.    Discharge:    Reason for Discharge: Patient cancelled all future appointments, anticipate that patient has met all of his goals.    Discharge Plan: Return to prior level of function.

## 2023-05-03 NOTE — ADDENDUM NOTE
Encounter addended by: Zoe Mejía, PT on: 5/3/2023 1:25 PM   Actions taken: Clinical Note Signed, Episode resolved

## 2023-06-29 ENCOUNTER — OFFICE VISIT (OUTPATIENT)
Dept: URGENT CARE | Facility: URGENT CARE | Age: 33
End: 2023-06-29
Payer: COMMERCIAL

## 2023-06-29 VITALS
DIASTOLIC BLOOD PRESSURE: 80 MMHG | TEMPERATURE: 97.6 F | RESPIRATION RATE: 20 BRPM | SYSTOLIC BLOOD PRESSURE: 124 MMHG | BODY MASS INDEX: 31.29 KG/M2 | HEART RATE: 81 BPM | WEIGHT: 215 LBS | OXYGEN SATURATION: 97 %

## 2023-06-29 DIAGNOSIS — S61.235A PUNCTURE WOUND OF LEFT RING FINGER: Primary | ICD-10-CM

## 2023-06-29 PROCEDURE — 99213 OFFICE O/P EST LOW 20 MIN: CPT | Performed by: FAMILY MEDICINE

## 2023-06-29 NOTE — PROGRESS NOTES
SUBJECTIVE: Skylar Gilman is a 32 year old male presenting with a chief complaint of left ring finger nail puncture wound.  Onset of symptoms was 15 min(s) ago.  Course of illness is same.        No past medical history on file.  No Known Allergies  Social History     Tobacco Use     Smoking status: Former     Types: Cigarettes     Start date: 2018     Quit date: 2022     Years since quittin.4     Smokeless tobacco: Former     Quit date: 2023   Substance Use Topics     Alcohol use: Never       ROS:  SKIN: no rash  GI: no vomiting    OBJECTIVE:  /80   Pulse 81   Temp 97.6  F (36.4  C) (Tympanic)   Resp 20   Wt 97.5 kg (215 lb)   SpO2 97%   BMI 31.29 kg/m  GENERAL APPEARANCE: healthy, alert and no distress  NEURO: Normal strength and tone, sensory exam grossly normal,  normal speech and mentation  SKIN: small puncture wound left ring finger      Clean dressed      ICD-10-CM    1. Puncture wound of left ring finger  S61.235A         TDAP utd  Fluids/Rest, f/u if worse/not any better

## 2023-10-01 ENCOUNTER — OFFICE VISIT (OUTPATIENT)
Dept: URGENT CARE | Facility: URGENT CARE | Age: 33
End: 2023-10-01
Payer: COMMERCIAL

## 2023-10-01 VITALS
HEART RATE: 97 BPM | WEIGHT: 210.8 LBS | RESPIRATION RATE: 20 BRPM | DIASTOLIC BLOOD PRESSURE: 88 MMHG | BODY MASS INDEX: 30.68 KG/M2 | TEMPERATURE: 98.2 F | OXYGEN SATURATION: 97 % | SYSTOLIC BLOOD PRESSURE: 141 MMHG

## 2023-10-01 DIAGNOSIS — R07.89 CHEST TIGHTNESS: ICD-10-CM

## 2023-10-01 DIAGNOSIS — F41.9 ANXIETY: Primary | ICD-10-CM

## 2023-10-01 PROCEDURE — 99213 OFFICE O/P EST LOW 20 MIN: CPT | Mod: 25 | Performed by: STUDENT IN AN ORGANIZED HEALTH CARE EDUCATION/TRAINING PROGRAM

## 2023-10-01 PROCEDURE — 93000 ELECTROCARDIOGRAM COMPLETE: CPT | Performed by: STUDENT IN AN ORGANIZED HEALTH CARE EDUCATION/TRAINING PROGRAM

## 2023-10-01 RX ORDER — HYDROXYZINE HYDROCHLORIDE 25 MG/1
25 TABLET, FILM COATED ORAL 4 TIMES DAILY PRN
Qty: 20 TABLET | Refills: 0 | Status: SHIPPED | OUTPATIENT
Start: 2023-10-01 | End: 2023-10-11

## 2023-10-01 ASSESSMENT — PAIN SCALES - GENERAL: PAINLEVEL: NO PAIN (0)

## 2023-10-01 ASSESSMENT — ANXIETY QUESTIONNAIRES
IF YOU CHECKED OFF ANY PROBLEMS ON THIS QUESTIONNAIRE, HOW DIFFICULT HAVE THESE PROBLEMS MADE IT FOR YOU TO DO YOUR WORK, TAKE CARE OF THINGS AT HOME, OR GET ALONG WITH OTHER PEOPLE: SOMEWHAT DIFFICULT
1. FEELING NERVOUS, ANXIOUS, OR ON EDGE: MORE THAN HALF THE DAYS
5. BEING SO RESTLESS THAT IT IS HARD TO SIT STILL: MORE THAN HALF THE DAYS
3. WORRYING TOO MUCH ABOUT DIFFERENT THINGS: MORE THAN HALF THE DAYS
2. NOT BEING ABLE TO STOP OR CONTROL WORRYING: SEVERAL DAYS
GAD7 TOTAL SCORE: 13
GAD7 TOTAL SCORE: 13
7. FEELING AFRAID AS IF SOMETHING AWFUL MIGHT HAPPEN: MORE THAN HALF THE DAYS
6. BECOMING EASILY ANNOYED OR IRRITABLE: MORE THAN HALF THE DAYS

## 2023-10-01 ASSESSMENT — PATIENT HEALTH QUESTIONNAIRE - PHQ9: 5. POOR APPETITE OR OVEREATING: MORE THAN HALF THE DAYS

## 2023-10-01 NOTE — PROGRESS NOTES
Assessment & Plan     Anxiety  Chest tightness  32-year-old man with history of atypical chest pain since 2021, who presents with 1 week of anxiety with associated difficulty breathing and chest tightness.  Differential includes but not limited to anxiety, panic attack.  Vitals notable for /88, otherwise vitals within normal limits.  On exam, the patient is nontoxic-appearing, no evidence of cardiopulmonary failure, symmetric air entry without focal wheezing or crackles, anterior chest pain is not reproducible to palpation.  ECG obtained and showed normal sinus rhythm, normal intervals, without ischemic changes.  ESHA 7 score: 13.  The patient denies suicidal or homicidal ideation.  Plan: Nutritious meals, sleep hygiene, exercise, hydroxyzine as needed.  Patient declined therapy referral.  Will refer to primary care for further management of anxiety.  Discussed return precautions.  The patient's questions were addressed.  - EKG 12-lead complete w/read - Clinics  - hydrOXYzine (ATARAX) 25 MG tablet  Dispense: 20 tablet; Refill: 0  - Primary Care Referral             Return in about 2 weeks (around 10/15/2023), or if symptoms worsen or fail to improve.    Fela Lundberg MD  [facility] [place]    Shantel Cheng is a 32 year old male who presents to clinic today for the following health issues:  Chief Complaint   Patient presents with    Anxiety     HPI    For the past week, the patient reports of anxiety  He reports of overthinking, difficulty breathing at rest and with activity lasting minutes  Today, developed throat and chest tightness which brought him in today  He tried breathing exercises  He had a panic yesterday and went to [facility] ED, he was given medication and calmed down  The patient reports of similar symptoms in 2021 with echo with borderline aortic dilation  Denies fever, URI symptoms  Does not have a PCP  Denies history of diabetes, HTN, dyslipidemia  Previous  smoking, stopped vaping and tobacco smoke for the past 10 months  Denies family history of MI or stroke  Denies illicit drug use  He reports of stress with 12 year old, 7-8 weeks, family problems, work issues  Denies suicidal ideation    Review of Systems  Constitutional, HEENT, cardiovascular, pulmonary, gi and gu systems are negative, except as otherwise noted.      Objective    BP (!) 141/88 (BP Location: Right arm, Patient Position: Sitting, Cuff Size: Adult Large)   Pulse 97   Temp 98.2  F (36.8  C) (Pulmonary Artery)   Resp 20   Wt 95.6 kg (210 lb 12.8 oz)   SpO2 97%   BMI 30.68 kg/m    Physical Exam   GENERAL: healthy, alert and no distress, nontoxic  EYES: Eyes grossly normal to inspection, and conjunctivae and sclerae normal  HENT: nose and mouth without ulcers or lesions  NECK: no adenopathy, neck supple  RESP: lungs clear to auscultation - no rales, rhonchi or wheezes  CV: regular rate and rhythm, normal S1 S2, no S3 or S4, no murmur, normal cap refill, and peripheral pulses strong  Chest: Anterior chest pain is not reproducible to palpation  MS: no gross musculoskeletal defects noted, no edema  SKIN: no suspicious lesions or rashes  NEURO: No focal neurologic deficits    GAD7 score: 13    EKG - Reviewed and interpreted by me appears normal, NSR, normal axis, normal intervals, no acute ST/T changes c/w ischemia, no LVH by voltage criteria, unchanged from previous tracings    Stress test in 8/2021  Borderline aortic root dilatation.  There was no chest pain or significant ST changes with exercise.  This was a normal stress echocardiogram with no evidence of stress-induced  ischemia.  Normal resting wall motion and no stress-induced wall motion abnormality.

## 2023-10-11 ENCOUNTER — OFFICE VISIT (OUTPATIENT)
Dept: FAMILY MEDICINE | Facility: CLINIC | Age: 33
End: 2023-10-11
Attending: STUDENT IN AN ORGANIZED HEALTH CARE EDUCATION/TRAINING PROGRAM
Payer: COMMERCIAL

## 2023-10-11 VITALS
SYSTOLIC BLOOD PRESSURE: 116 MMHG | BODY MASS INDEX: 30.06 KG/M2 | TEMPERATURE: 98.1 F | OXYGEN SATURATION: 96 % | RESPIRATION RATE: 18 BRPM | WEIGHT: 210 LBS | DIASTOLIC BLOOD PRESSURE: 76 MMHG | HEART RATE: 85 BPM | HEIGHT: 70 IN

## 2023-10-11 DIAGNOSIS — H81.10 BENIGN PAROXYSMAL POSITIONAL VERTIGO, UNSPECIFIED LATERALITY: Primary | ICD-10-CM

## 2023-10-11 DIAGNOSIS — F41.9 ANXIETY: ICD-10-CM

## 2023-10-11 PROCEDURE — 99213 OFFICE O/P EST LOW 20 MIN: CPT | Performed by: NURSE PRACTITIONER

## 2023-10-11 RX ORDER — MECLIZINE HYDROCHLORIDE 25 MG/1
25 TABLET ORAL 3 TIMES DAILY PRN
Qty: 30 TABLET | Refills: 0 | Status: SHIPPED | OUTPATIENT
Start: 2023-10-11

## 2023-10-11 RX ORDER — HYDROXYZINE HYDROCHLORIDE 25 MG/1
25 TABLET, FILM COATED ORAL 4 TIMES DAILY PRN
Qty: 90 TABLET | Refills: 1 | Status: SHIPPED | OUTPATIENT
Start: 2023-10-11

## 2023-10-11 ASSESSMENT — PAIN SCALES - GENERAL: PAINLEVEL: NO PAIN (0)

## 2023-10-11 NOTE — PROGRESS NOTES
Assessment & Plan     Anxiety  Has found hydroxyzine to be very helpful.  Will have him continue to use this on an as needed basis. If anxiety is worsening or he is finding he is needing to use medication daily he should be seen for follow-up.  At that time would consider ssri.    - Primary Care Referral  - hydrOXYzine (ATARAX) 25 MG tablet; Take 1 tablet (25 mg) by mouth 4 times daily as needed for anxiety    Benign paroxysmal positional vertigo, unspecified laterality  Recurrent. Normal neuro exam today.  Can continue to use home exercises he has been taught in the past.  Can trial meclizine.  Follow-up as needed.   - meclizine (ANTIVERT) 25 MG tablet; Take 1 tablet (25 mg) by mouth 3 times daily as needed for dizziness                PRASHANTH Godinez CNP  [facility] VIVEK Cheng is a 32 year old, presenting for the following health issues:  Urgent Care (Anxiety, off balance concern)        10/11/2023     8:47 AM   Additional Questions   Roomed by Dalia         10/11/2023     8:47 AM   Patient Reported Additional Medications   Patient reports taking the following new medications none       HPI       ED/UC Followup    Facility:  [facility]  Date of visit: 10/1/23  Reason for visit: anxiety, chest tightness  Current Status: I'm good, but still having off balance issue intermittently with bending over and movement of head. Has done PT at [facility], feels did NOT help at all.   The first two days after being in  took hydroxyzine twice a day.  Next two only took hydroxyzine once a day and the last 2 days he has not taken any.  He feels anxiety continues to be well controlled. He did find this medication to be quite helpful.       Recalls first episode of vertigo in 2021.   Reading about symptoms online led to panic attack and subsequently seen in the ER.   Seen again for vertigo in March of this year.  Did 3 sessions of PT for vertigo that didn't do  "anything.   Gets episodes of vertigo a couple times a year.   In the past was given meclizine, but does not recall trying this.   Symptoms are exacerbated by head movements.   He does find the Epley maneuver helpful.         Review of Systems   Constitutional, HEENT, cardiovascular, pulmonary, gi and gu systems are negative, except as otherwise noted.      Objective    /76 (BP Location: Right arm, Patient Position: Sitting, Cuff Size: Adult Large)   Pulse 85   Temp 98.1  F (36.7  C) (Oral)   Resp 18   Ht 1.765 m (5' 9.5\")   Wt 95.3 kg (210 lb)   SpO2 96%   BMI 30.57 kg/m    Body mass index is 30.57 kg/m .  Physical Exam   GENERAL: healthy, alert and no distress  EYES: Eyes grossly normal to inspection, EOMI, PERRL and conjunctivae and sclerae normal  HENT: ear canals and TM's normal, nose and mouth without ulcers or lesions  NECK: no adenopathy, no asymmetry, masses, or scars and thyroid normal to palpation  RESP: lungs clear to auscultation - no rales, rhonchi or wheezes  CV: regular rate and rhythm, normal S1 S2, no S3 or S4, no murmur  MS: no gross musculoskeletal defects noted  SKIN: no suspicious lesions or rashes  NEURO: Normal strength and tone, mentation intact, cranial nerves 2-12 intact, Romberg normal, rapid alternating movements normal, and gait normal  PSYCH: mentation appears normal, affect normal/bright    No results found for this or any previous visit (from the past 24 hour(s)).                  "

## 2023-10-12 ENCOUNTER — TELEPHONE (OUTPATIENT)
Dept: FAMILY MEDICINE | Facility: CLINIC | Age: 33
End: 2023-10-12
Payer: COMMERCIAL

## 2023-10-12 NOTE — TELEPHONE ENCOUNTER
Jia Allen Ea/Rm16 minutes ago (8:49 AM)       PA DENIED - please see denial rationale.  If provider would like to appeal please provide LMN and route encounter back to me.  Otherwise please notify the patient and close encounter when finished.  Thank you,  Jia

## 2023-10-12 NOTE — TELEPHONE ENCOUNTER
Central Prior Authorization Team   Phone: [phone]    PRIOR AUTHORIZATION DENIED    Medication: MECLIZINE HCL 25 MG PO TABS  Insurance Company: [employer] [place] - Phone [phone] Fax [phone]  Denial Date: 10/11/2023  Denial Rational: MUST TRY/FAIL FORMULARY ALTERNATIVES - SCOPOLAMINE PATCH, PROMETHAZINE TABLETS      Appeal Information: IF PROVIDER WOULD LIKE TO APPEAL THIS DECISION PLEASE PROVIDE THE PA TEAM WITH A LETTER OF MEDICAL NECESSITY      Patient Notified: No

## 2023-10-13 NOTE — TELEPHONE ENCOUNTER
Message left to return call to triage nurse     Shey Rao, Registered Nurse  [facility]      [Normal Appearance] : normal appearance [General Appearance - In No Acute Distress] : no acute distress [FreeTextEntry1] : normal peripheral circulation  [] : no respiratory distress [Oriented To Time, Place, And Person] : oriented to person, place, and time

## 2023-10-21 ENCOUNTER — HEALTH MAINTENANCE LETTER (OUTPATIENT)
Age: 33
End: 2023-10-21

## 2023-11-05 ENCOUNTER — HOSPITAL ENCOUNTER (EMERGENCY)
Facility: CLINIC | Age: 33
Discharge: HOME | End: 2023-11-05
Attending: EMERGENCY MEDICINE | Admitting: EMERGENCY MEDICINE
Payer: COMMERCIAL

## 2023-11-05 VITALS
RESPIRATION RATE: 18 BRPM | DIASTOLIC BLOOD PRESSURE: 85 MMHG | OXYGEN SATURATION: 99 % | HEART RATE: 74 BPM | SYSTOLIC BLOOD PRESSURE: 135 MMHG | TEMPERATURE: 97.2 F

## 2023-11-05 DIAGNOSIS — R07.0 CHRONIC THROAT PAIN: ICD-10-CM

## 2023-11-05 DIAGNOSIS — J02.0 STREP THROAT: ICD-10-CM

## 2023-11-05 DIAGNOSIS — G89.29 CHRONIC THROAT PAIN: ICD-10-CM

## 2023-11-05 LAB
FLUAV RNA SPEC QL NAA+PROBE: NEGATIVE
FLUBV RNA RESP QL NAA+PROBE: NEGATIVE
GROUP A STREP BY PCR: DETECTED
RSV RNA SPEC NAA+PROBE: NEGATIVE
SARS-COV-2 RNA RESP QL NAA+PROBE: NEGATIVE

## 2023-11-05 PROCEDURE — 99284 EMERGENCY DEPT VISIT MOD MDM: CPT

## 2023-11-05 PROCEDURE — 87637 SARSCOV2&INF A&B&RSV AMP PRB: CPT | Performed by: EMERGENCY MEDICINE

## 2023-11-05 PROCEDURE — 250N000013 HC RX MED GY IP 250 OP 250 PS 637: Performed by: EMERGENCY MEDICINE

## 2023-11-05 PROCEDURE — 250N000011 HC RX IP 250 OP 636: Performed by: EMERGENCY MEDICINE

## 2023-11-05 PROCEDURE — 87651 STREP A DNA AMP PROBE: CPT | Performed by: EMERGENCY MEDICINE

## 2023-11-05 RX ORDER — ONDANSETRON 4 MG/1
4 TABLET, ORALLY DISINTEGRATING ORAL EVERY 8 HOURS PRN
Qty: 10 TABLET | Refills: 0 | Status: SHIPPED | OUTPATIENT
Start: 2023-11-05 | End: 2023-11-08

## 2023-11-05 RX ORDER — ONDANSETRON 4 MG/1
4 TABLET, ORALLY DISINTEGRATING ORAL ONCE
Status: COMPLETED | OUTPATIENT
Start: 2023-11-05 | End: 2023-11-05

## 2023-11-05 RX ORDER — AMOXICILLIN 500 MG/1
500 CAPSULE ORAL 2 TIMES DAILY
Qty: 20 CAPSULE | Refills: 0 | Status: SHIPPED | OUTPATIENT
Start: 2023-11-05 | End: 2023-11-15

## 2023-11-05 RX ADMIN — Medication 10 MG: at 13:38

## 2023-11-05 RX ADMIN — ONDANSETRON 4 MG: 4 TABLET, ORALLY DISINTEGRATING ORAL at 13:38

## 2023-11-05 NOTE — ED PROVIDER NOTES
History     Chief Complaint:  Pharyngitis       HPI     Skylar Gilman is a 32 year old male who presents to the emergency department for evaluation pharyngitis that started 1 week ago. Patient reports a sensation of throat tightness that is causing gagging and feeling like he needs to vomit. Worse with movement and talking. He has had episodes like this on and off for years but has never been evaluated by ENT. Notes that he was seen at urgent care 1 month ago and strep was negative. He has been eating and drinking normally. Ne recent unexplained weight loss or weigh gain. No recent sick contacts. Does have a history of smoking and vaping, but quit last January. No medication allergies.    Independent Historian:   None - Patient Only    Review of External Notes:   None.       Medications:    Atarax  Antivert    Past Medical History:    No past medical history on file.    Past Surgical History:    None.    Physical Exam   Patient Vitals for the past 24 hrs:   BP Temp Temp src Pulse Resp SpO2   11/05/23 1300 135/85 -- -- 74 -- 99 %   11/05/23 1237 (!) 147/86 97.2  F (36.2  C) Temporal -- 18 --        Physical Exam    GEN:    Pleasant, age appropriate.     Resting comfortably in the bed.  HEENT:    Tympanic membranes are clear bilateral.      Oropharynx is moist.       Posterior pharyngeal erythema without exudate or asymmetric edema.     No deviation of the uvula.     No pooling of secretions, trismus or sublingual edema.  Eyes:    Conjunctiva normal, PERRL  Neck:    Supple, no meningismus.  No pain with manipulation of the hyoid.   CV:     Regular rate and rhythm, no murmurs, rubs or gallops.    PULM:    Clear to auscultation bilateral.       No respiratory distress.       No stridor.  ABD:    Soft, non-tender, non-distended.      No rebound or guarding.     No splenomegaly.  MSK:     No gross deformity to all four extremities.   LYMPH:   No cervical lymphadenopathy.  NEURO:   Alert.  Normal muscular tone, no  atrophy.  Skin:    Warm, dry and intact.    PSYCH:    Mood is good and affect is appropriate.      Emergency Department Course     Laboratory:  Labs Ordered and Resulted from Time of ED Arrival to Time of ED Departure   GROUP A STREPTOCOCCUS PCR THROAT SWAB - Abnormal       Result Value    Group A strep by PCR Detected (*)    INFLUENZA A/B, RSV, & SARS-COV2 PCR - Normal    Influenza A PCR Negative      Influenza B PCR Negative      RSV PCR Negative      SARS CoV2 PCR Negative          Emergency Department Course & Assessments:         Interventions:  Medications   dexAMETHasone (DECADRON) alcohol-free oral solution 10 mg (10 mg Oral $Given 233)   ondansetron (ZOFRAN ODT) ODT tab 4 mg (4 mg Oral $Given 23)        Independent Interpretation (X-rays, CTs, rhythm strip):  None    Consultations/Discussion of Management or Tests:  None        Social Determinants of Health affecting care:   None    Disposition:  The patient was discharged to home.     Impression & Plan        Medical Decision Makin-year-old male with a history of chronic throat discomfort presents with increasing symptoms over last 1 week.  He tested positive for streptococcal pharyngitis as a source of his escalating symptoms.  No features concerning for peritonsillar abscess, retropharyngeal abscess, bacterial tracheitis or Lemierre's syndrome.  Patient discharged home on amoxicillin, single dose of dexamethasone.  Referral to ENT for the chronic portion of his throat discomfort.  Return to ED for worsening symptoms.      Diagnosis:    ICD-10-CM    1. Strep throat  J02.0       2. Chronic throat pain  R07.0     G89.29            Discharge Medications:  Discharge Medication List as of 2023  1:45 PM        START taking these medications    Details   amoxicillin (AMOXIL) 500 MG capsule Take 1 capsule (500 mg) by mouth 2 times daily for 10 days, Disp-20 capsule, R-0, Local Print      ondansetron (ZOFRAN ODT) 4 MG ODT tab Take 1  tablet (4 mg) by mouth every 8 hours as needed for nausea, Disp-10 tablet, R-0, Local Print              11/5/2023   Conner Stafford MD Matthews, Jeremiah R, MD  11/05/23 1400

## 2023-11-05 NOTE — DISCHARGE INSTRUCTIONS
Please make an appointment to follow up with [facility] [phone] in 10-14 days even if entirely better.    Discharge Instructions  Sore Throat  You were seen today for a sore throat, in medical terms this is called pharyngitis. A sore throat is most often caused by viral or bacterial infections; most of which (>80%) are caused by a virus. Viral infections are not treated with antibiotics, treatment for a viral sore throat consists mostly of treating symptoms (such as pain and fever) with over-the-counter pain relievers/fever reducers.  Strep throat is a kind of sore throat caused by Group A streptococcus bacteria.  This type of sore throat is treated with antibiotics so we test for this with a  strep test  and, if positive, prescribe antibiotics.  Generally, every Emergency Department visit should have a follow-up clinic visit with either a primary or a specialty clinic/provider. Please follow-up as instructed by your emergency provider today.  Return to the Emergency Department if:  If you have difficulty breathing.  If you are drooling because you are unable to swallow.  You become dehydrated due to difficulty drinking. Signs of dehydration include weakness, dry mouth, and urinating less than 3 times per day.  If you develop swelling of the neck or tongue.  If you develop a high fever with either severe or unusual headache or stiff neck.    Treatment:    Pain relief -- Non-prescription pain medications, such as Tylenol  (acetaminophen) or Motrin , Advil  (ibuprofen) are usually recommended for pain.  Do not use a medicine that you are allergic to, or if your provider has told you not to use it.  Soft or liquid diet. Concentrate on liquids to keep yourself hydrated. Cold liquids (popsicles, ice cream, etc.) may feel good on your throat.  If you were given a prescription for medicine here today, be sure to read all of the information (including the package insert) that comes with your prescription.   This will include important information about the medicine, its side effects, and any warnings that you need to know about.  The pharmacist who fills the prescription can provide more information and answer questions you may have about the medicine.  If you have questions or concerns that the pharmacist cannot address, please call or return to the Emergency Department.   Remember that you can always come back to the Emergency Department if you are not able to see your regular provider in the amount of time listed above, if you get any new symptoms, or if there is anything that worries you.

## 2023-11-05 NOTE — ED TRIAGE NOTES
Patient reports 5 days of throat discomfort and discomfort when he swallows.      Triage Assessment (Adult)       Row Name 11/05/23 2737          Triage Assessment    Airway WDL WDL        Respiratory WDL    Respiratory WDL WDL        Skin Circulation/Temperature WDL    Skin Circulation/Temperature WDL WDL        Cardiac WDL    Cardiac WDL WDL        Peripheral/Neurovascular WDL    Peripheral Neurovascular WDL WDL        Cognitive/Neuro/Behavioral WDL    Cognitive/Neuro/Behavioral WDL WDL

## 2024-04-06 ENCOUNTER — OFFICE VISIT (OUTPATIENT)
Dept: URGENT CARE | Facility: URGENT CARE | Age: 34
End: 2024-04-06
Payer: COMMERCIAL

## 2024-04-06 VITALS
SYSTOLIC BLOOD PRESSURE: 138 MMHG | DIASTOLIC BLOOD PRESSURE: 85 MMHG | HEART RATE: 76 BPM | OXYGEN SATURATION: 96 % | TEMPERATURE: 98.2 F

## 2024-04-06 DIAGNOSIS — M79.5 FOREIGN BODY (FB) IN SOFT TISSUE: Primary | ICD-10-CM

## 2024-04-06 PROCEDURE — 10120 INC&RMVL FB SUBQ TISS SMPL: CPT | Performed by: FAMILY MEDICINE

## 2024-04-06 NOTE — PROGRESS NOTES
SUBJECTIVE:  Chief Complaint   Patient presents with    Urgent Care     Sliver in left hand x 8 days     Skylar Gilman is a 33 year old male who presents with a chief complaint of sliver in left hand.    Accidentally got wood splinter from construction board on left index finger.  This was last Friday (about 8 days ago).  Patient was able to remove a part of the wooden splint but broke off, did try to remove himself but unable.    Endorse more pain at site of injury.    Tdap 2019    History reviewed. No pertinent past medical history.  Current Outpatient Medications   Medication Sig Dispense Refill    hydrOXYzine (ATARAX) 25 MG tablet Take 1 tablet (25 mg) by mouth 4 times daily as needed for anxiety (Patient not taking: Reported on 2024) 90 tablet 1    meclizine (ANTIVERT) 25 MG tablet Take 1 tablet (25 mg) by mouth 3 times daily as needed for dizziness (Patient not taking: Reported on 2024) 30 tablet 0     Social History     Tobacco Use    Smoking status: Former     Packs/day: 1.00     Years: 3.00     Additional pack years: 0.00     Total pack years: 3.00     Types: Cigarettes     Start date: 2019     Quit date: 2021     Years since quittin.2     Passive exposure: Past    Smokeless tobacco: Former     Quit date: 2023    Tobacco comments:     Smoked 3 years   Substance Use Topics    Alcohol use: Never       ROS:  Review of systems negative except as stated above.    EXAM:   /85 (BP Location: Right arm, Patient Position: Sitting, Cuff Size: Adult Large)   Pulse 76   Temp 98.2  F (36.8  C) (Tympanic)   SpO2 96%   GENERAL APPEARANCE: healthy, alert and no distress  EXTREMITIES: peripheral pulses normal  SKIN: left index finger -proximal phalanx on volar aspect with tenderness, faint swelling, self superficial abrasion scraping  PSYCH:alert, affect bright      ASSESSMENT/PLAN:  (M79.5) Foreign body (FB) in soft tissue  (primary encounter diagnosis)  Comment: left index  finger  Plan: amoxicillin-clavulanate (AUGMENTIN) 875-125 MG         tablet, REMOVE FOREIGN BODY SIMPLE            Verbal consent given for foreign body removal.    Lidocaine 1% with epi - 2 ml injected into area with good anesthesia.  11 blade inserted into wound, tweezers probed into wound and able to remove wooden splinter.  Bloody drainage, no purulence.  Patient tolerated procedure well.  Dressing placed.    RX Augmentin given to prevent wound infection due to prolong retain foreign body.  Okay for tylenol and ibuprofen for discomfort    Follow up with primary provider if no improvement of symptoms within 1 week    Kael Truong MD  April 6, 2024 4:33 PM

## 2024-06-21 ENCOUNTER — TELEPHONE (OUTPATIENT)
Dept: FAMILY MEDICINE | Facility: CLINIC | Age: 34
End: 2024-06-21
Payer: COMMERCIAL

## 2024-06-21 NOTE — TELEPHONE ENCOUNTER
Patient Quality Outreach    Patient is due for the following:   Depression  -  Depression follow-up visit    Next Steps:   Schedule a office visit for Depression follow up     Type of outreach:    Sent [employer] message.    Next Steps:  Reach out within 90 days via [employer].    Max number of attempts reached: No. Will try again in 90 days if patient still on fail list.    Questions for provider review:    None           Angelica Gonzalez MA  Chart routed to Care Team.

## 2024-12-14 ENCOUNTER — HEALTH MAINTENANCE LETTER (OUTPATIENT)
Age: 34
End: 2024-12-14

## 2025-06-10 ENCOUNTER — HOSPITAL ENCOUNTER (EMERGENCY)
Facility: CLINIC | Age: 35
Discharge: HOME | End: 2025-06-10
Attending: EMERGENCY MEDICINE | Admitting: EMERGENCY MEDICINE
Payer: COMMERCIAL

## 2025-06-10 ENCOUNTER — APPOINTMENT (OUTPATIENT)
Dept: GENERAL RADIOLOGY | Facility: CLINIC | Age: 35
End: 2025-06-10
Attending: SOCIAL WORKER
Payer: COMMERCIAL

## 2025-06-10 VITALS
RESPIRATION RATE: 18 BRPM | HEART RATE: 69 BPM | TEMPERATURE: 98.6 F | SYSTOLIC BLOOD PRESSURE: 131 MMHG | OXYGEN SATURATION: 97 % | HEIGHT: 70 IN | DIASTOLIC BLOOD PRESSURE: 79 MMHG | BODY MASS INDEX: 30.99 KG/M2 | WEIGHT: 216.49 LBS

## 2025-06-10 DIAGNOSIS — S29.012A UPPER BACK STRAIN, INITIAL ENCOUNTER: ICD-10-CM

## 2025-06-10 LAB
ANION GAP SERPL CALCULATED.3IONS-SCNC: 12 MMOL/L (ref 7–15)
BASOPHILS # BLD AUTO: 0.1 10E3/UL (ref 0–0.2)
BASOPHILS NFR BLD AUTO: 1 %
BUN SERPL-MCNC: 15.2 MG/DL (ref 6–20)
CALCIUM SERPL-MCNC: 9.6 MG/DL (ref 8.8–10.4)
CHLORIDE SERPL-SCNC: 103 MMOL/L (ref 98–107)
CREAT SERPL-MCNC: 1.14 MG/DL (ref 0.67–1.17)
D DIMER PPP FEU-MCNC: <0.27 UG/ML FEU (ref 0–0.5)
EGFRCR SERPLBLD CKD-EPI 2021: 85 ML/MIN/1.73M2
EOSINOPHIL # BLD AUTO: 0.5 10E3/UL (ref 0–0.7)
EOSINOPHIL NFR BLD AUTO: 5 %
ERYTHROCYTE [DISTWIDTH] IN BLOOD BY AUTOMATED COUNT: 11 % (ref 10–15)
GLUCOSE SERPL-MCNC: 93 MG/DL (ref 70–99)
HCO3 SERPL-SCNC: 24 MMOL/L (ref 22–29)
HCT VFR BLD AUTO: 43.1 % (ref 40–53)
HGB BLD-MCNC: 15.2 G/DL (ref 13.3–17.7)
IMM GRANULOCYTES # BLD: 0 10E3/UL
IMM GRANULOCYTES NFR BLD: 0 %
LYMPHOCYTES # BLD AUTO: 2.5 10E3/UL (ref 0.8–5.3)
LYMPHOCYTES NFR BLD AUTO: 26 %
MCH RBC QN AUTO: 31.9 PG (ref 26.5–33)
MCHC RBC AUTO-ENTMCNC: 35.3 G/DL (ref 31.5–36.5)
MCV RBC AUTO: 90 FL (ref 78–100)
MONOCYTES # BLD AUTO: 0.6 10E3/UL (ref 0–1.3)
MONOCYTES NFR BLD AUTO: 6 %
NEUTROPHILS # BLD AUTO: 6 10E3/UL (ref 1.6–8.3)
NEUTROPHILS NFR BLD AUTO: 62 %
NRBC # BLD AUTO: 0 10E3/UL
NRBC BLD AUTO-RTO: 0 /100
PLATELET # BLD AUTO: 263 10E3/UL (ref 150–450)
POTASSIUM SERPL-SCNC: 4 MMOL/L (ref 3.4–5.3)
RBC # BLD AUTO: 4.77 10E6/UL (ref 4.4–5.9)
SODIUM SERPL-SCNC: 139 MMOL/L (ref 135–145)
WBC # BLD AUTO: 9.7 10E3/UL (ref 4–11)

## 2025-06-10 PROCEDURE — 99285 EMERGENCY DEPT VISIT HI MDM: CPT | Mod: 25

## 2025-06-10 PROCEDURE — 85025 COMPLETE CBC W/AUTO DIFF WBC: CPT | Performed by: EMERGENCY MEDICINE

## 2025-06-10 PROCEDURE — 82310 ASSAY OF CALCIUM: CPT | Performed by: EMERGENCY MEDICINE

## 2025-06-10 PROCEDURE — 71046 X-RAY EXAM CHEST 2 VIEWS: CPT

## 2025-06-10 PROCEDURE — 85379 FIBRIN DEGRADATION QUANT: CPT | Performed by: EMERGENCY MEDICINE

## 2025-06-10 PROCEDURE — 250N000013 HC RX MED GY IP 250 OP 250 PS 637: Performed by: SOCIAL WORKER

## 2025-06-10 PROCEDURE — 36415 COLL VENOUS BLD VENIPUNCTURE: CPT | Performed by: EMERGENCY MEDICINE

## 2025-06-10 RX ORDER — IBUPROFEN 600 MG/1
600 TABLET, FILM COATED ORAL ONCE
Status: COMPLETED | OUTPATIENT
Start: 2025-06-10 | End: 2025-06-10

## 2025-06-10 RX ORDER — CYCLOBENZAPRINE HCL 10 MG
10 TABLET ORAL 3 TIMES DAILY PRN
Qty: 20 TABLET | Refills: 0 | Status: SHIPPED | OUTPATIENT
Start: 2025-06-10 | End: 2025-06-17

## 2025-06-10 RX ADMIN — IBUPROFEN 600 MG: 600 TABLET ORAL at 20:29

## 2025-06-10 ASSESSMENT — COLUMBIA-SUICIDE SEVERITY RATING SCALE - C-SSRS
1. IN THE PAST MONTH, HAVE YOU WISHED YOU WERE DEAD OR WISHED YOU COULD GO TO SLEEP AND NOT WAKE UP?: NO
6. HAVE YOU EVER DONE ANYTHING, STARTED TO DO ANYTHING, OR PREPARED TO DO ANYTHING TO END YOUR LIFE?: NO
2. HAVE YOU ACTUALLY HAD ANY THOUGHTS OF KILLING YOURSELF IN THE PAST MONTH?: NO

## 2025-06-10 ASSESSMENT — ACTIVITIES OF DAILY LIVING (ADL)
ADLS_ACUITY_SCORE: 41
ADLS_ACUITY_SCORE: 41

## 2025-06-11 LAB
ATRIAL RATE - MUSE: 75 BPM
DIASTOLIC BLOOD PRESSURE - MUSE: NORMAL MMHG
INTERPRETATION ECG - MUSE: NORMAL
P AXIS - MUSE: 26 DEGREES
PR INTERVAL - MUSE: 146 MS
QRS DURATION - MUSE: 88 MS
QT - MUSE: 354 MS
QTC - MUSE: 395 MS
R AXIS - MUSE: 83 DEGREES
SYSTOLIC BLOOD PRESSURE - MUSE: NORMAL MMHG
T AXIS - MUSE: 26 DEGREES
VENTRICULAR RATE- MUSE: 75 BPM

## 2025-06-11 NOTE — ED TRIAGE NOTES
Pt arrives for sharp upper L shoulder/back pain starting 2-3 days ago. No known trauma. Pain worse with deep breathing and moving. Denies lung history. Breath sounds equal bilaterally. Denies any chest pain. VSS.

## 2025-06-11 NOTE — DISCHARGE INSTRUCTIONS
Return to the ER for worsening pain, difficulty breathing, or any new concerns.    Do not drive, use machinery, use alcohol, or use other sedating medications while taking Flexeril.

## 2025-06-11 NOTE — ED PROVIDER NOTES
"  Emergency Department Note      History of Present Illness     Chief Complaint   Shoulder Pain      HPI   Skylar Gilman is a 34 year old male who presents to the ED for left upper, lateral back pain.  No direct trauma.  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  He says the pain is pleuritic but also worse with movement.  No rash over the area.  No precordial pain or exertional pain.  No personal or family history of heart disease.  No recent fever, cough, congestion.  No history of DVT or PE.  No leg swelling.  No abdominal pain.  No radiation of the pain to the arms.  No focal weakness or paresthesias.    Past Medical History     Medical History and Problem List   No past medical history on file.    Medications   cyclobenzaprine (FLEXERIL) 10 MG tablet  hydrOXYzine (ATARAX) 25 MG tablet  meclizine (ANTIVERT) 25 MG tablet        Surgical History   No past surgical history on file.    Physical Exam     Patient Vitals for the past 24 hrs:   BP Temp Temp src Pulse Resp SpO2 Height Weight   06/10/25 2351 131/79 -- -- 69 -- 97 % -- --   06/10/25 2026 (!) 131/99 98.6  F (37  C) Temporal 77 18 99 % 1.778 m (5' 10\") 98.2 kg (216 lb 7.9 oz)     Physical Exam  Constitutional:       General: He is not in acute distress.     Appearance: Normal appearance. He is not toxic-appearing.   HENT:      Head: Atraumatic.      Right Ear: External ear normal.      Left Ear: External ear normal.   Eyes:      General: No scleral icterus.     Conjunctiva/sclera: Conjunctivae normal.   Cardiovascular:      Rate and Rhythm: Normal rate and regular rhythm.      Heart sounds: Normal heart sounds.   Pulmonary:      Effort: Pulmonary effort is normal. No respiratory distress.      Breath sounds: Normal breath sounds.   Abdominal:      Palpations: Abdomen is soft.      Tenderness: There is no abdominal tenderness.   Musculoskeletal:         General: No deformity.      Cervical back: Neck supple.      Right lower leg: " No edema.      Left lower leg: No edema.      Comments: No midline cervical or thoracic pain.  There is tenderness inferior to the left scapula and over the lateral chest wall.  No crepitance.  No erythema or rash.  No vesicles.  No effusion of the left shoulder.  Range of motion somewhat limited by pain.   Skin:     General: Skin is warm.      Capillary Refill: Capillary refill takes less than 2 seconds.   Neurological:      Mental Status: He is alert.      Comments: Strength and sensation intact to the upper extremities.   Psychiatric:         Mood and Affect: Mood normal.         Behavior: Behavior normal.           Diagnostics     Lab Results   Labs Ordered and Resulted from Time of ED Arrival to Time of ED Departure   D DIMER QUANTITATIVE - Normal       Result Value    D-Dimer Quantitative <0.27     BASIC METABOLIC PANEL - Normal    Sodium 139      Potassium 4.0      Chloride 103      Carbon Dioxide (CO2) 24      Anion Gap 12      Urea Nitrogen 15.2      Creatinine 1.14      GFR Estimate 85      Calcium 9.6      Glucose 93     CBC WITH PLATELETS AND DIFFERENTIAL    WBC Count 9.7      RBC Count 4.77      Hemoglobin 15.2      Hematocrit 43.1      MCV 90      MCH 31.9      MCHC 35.3      RDW 11.0      Platelet Count 263      % Neutrophils 62      % Lymphocytes 26      % Monocytes 6      % Eosinophils 5      % Basophils 1      % Immature Granulocytes 0      NRBCs per 100 WBC 0      Absolute Neutrophils 6.0      Absolute Lymphocytes 2.5      Absolute Monocytes 0.6      Absolute Eosinophils 0.5      Absolute Basophils 0.1      Absolute Immature Granulocytes 0.0      Absolute NRBCs 0.0         Imaging   Chest XR,  PA & LAT   Final Result   IMPRESSION: No change. Negative chest.          EKG   ECG results from 06/10/25   EKG 12-lead, tracing only     Value    Systolic Blood Pressure     Diastolic Blood Pressure     Ventricular Rate 75    Atrial Rate 75    CT Interval 146    QRS Duration 88        QTc 395    P  Axis 26    R AXIS 83    T Axis 26    Interpretation ECG      Sinus rhythm  Normal ECG  When compared with ECG of 10-Aug-2021 11:20, (unconfirmed)  No significant change was found          Independent Interpretation   Chest x-ray independently interpreted.  No pneumothorax.    ED Course      Medications Administered   Medications   ibuprofen (ADVIL/MOTRIN) tablet 600 mg (600 mg Oral $Given 6/10/25 2029)       Medical Decision Making / Diagnosis     LEROY Gilman is a 34 year old male who presents to the ED for evaluation of left upper and lateral back pain.  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  We discussed other potential etiologies such as pneumothorax, rib fracture, PE, pneumonia.  Fortunately workup is negative.  He has a narrow mediastinum and there is no anterior chest pain radiating into the back.  He is not significantly hypertensive.    The patient was prescribed Flexeril.  He is comfortable with the outpatient follow-up plan.  We discussed return precautions.    Disposition   The patient was discharged.     Diagnosis     ICD-10-CM    1. Upper back strain, initial encounter  S29.012A            Discharge Medications   Discharge Medication List as of 6/10/2025 11:47 PM        START taking these medications    Details   cyclobenzaprine (FLEXERIL) 10 MG tablet Take 1 tablet (10 mg) by mouth 3 times daily as needed., Disp-20 tablet, R-0, E-Prescribe                     Rafael Nelson MD  06/11/25 2654